# Patient Record
Sex: FEMALE
[De-identification: names, ages, dates, MRNs, and addresses within clinical notes are randomized per-mention and may not be internally consistent; named-entity substitution may affect disease eponyms.]

---

## 2020-10-15 ENCOUNTER — HOSPITAL ENCOUNTER (EMERGENCY)
Dept: HOSPITAL 88 - ER | Age: 75
Discharge: HOME | End: 2020-10-15
Payer: MEDICARE

## 2020-10-15 VITALS — WEIGHT: 180 LBS | BODY MASS INDEX: 29.99 KG/M2 | HEIGHT: 65 IN

## 2020-10-15 DIAGNOSIS — Z86.73: ICD-10-CM

## 2020-10-15 DIAGNOSIS — Z43.1: Primary | ICD-10-CM

## 2020-10-15 DIAGNOSIS — I10: ICD-10-CM

## 2020-10-15 DIAGNOSIS — E11.9: ICD-10-CM

## 2020-10-15 DIAGNOSIS — E03.9: ICD-10-CM

## 2020-10-15 DIAGNOSIS — E78.5: ICD-10-CM

## 2020-10-15 PROCEDURE — 74018 RADEX ABDOMEN 1 VIEW: CPT

## 2020-10-15 PROCEDURE — 99284 EMERGENCY DEPT VISIT MOD MDM: CPT

## 2020-10-28 ENCOUNTER — HOSPITAL ENCOUNTER (INPATIENT)
Dept: HOSPITAL 88 - ER | Age: 75
LOS: 9 days | Discharge: NURSING, REHAB OR LONG-TERM CARE | DRG: 870 | End: 2020-11-06
Attending: INTERNAL MEDICINE | Admitting: INTERNAL MEDICINE
Payer: MEDICARE

## 2020-10-28 VITALS — DIASTOLIC BLOOD PRESSURE: 63 MMHG | SYSTOLIC BLOOD PRESSURE: 96 MMHG

## 2020-10-28 VITALS — BODY MASS INDEX: 25.58 KG/M2 | WEIGHT: 153.5 LBS | HEIGHT: 65 IN

## 2020-10-28 DIAGNOSIS — R65.21: ICD-10-CM

## 2020-10-28 DIAGNOSIS — L89.154: ICD-10-CM

## 2020-10-28 DIAGNOSIS — I48.91: ICD-10-CM

## 2020-10-28 DIAGNOSIS — Z74.01: ICD-10-CM

## 2020-10-28 DIAGNOSIS — R57.0: ICD-10-CM

## 2020-10-28 DIAGNOSIS — J96.90: ICD-10-CM

## 2020-10-28 DIAGNOSIS — Z93.1: ICD-10-CM

## 2020-10-28 DIAGNOSIS — A41.9: Primary | ICD-10-CM

## 2020-10-28 DIAGNOSIS — Z66: ICD-10-CM

## 2020-10-28 DIAGNOSIS — L89.313: ICD-10-CM

## 2020-10-28 DIAGNOSIS — Z16.12: ICD-10-CM

## 2020-10-28 DIAGNOSIS — M86.9: ICD-10-CM

## 2020-10-28 DIAGNOSIS — B96.4: ICD-10-CM

## 2020-10-28 DIAGNOSIS — Z79.01: ICD-10-CM

## 2020-10-28 DIAGNOSIS — I46.9: ICD-10-CM

## 2020-10-28 DIAGNOSIS — E03.9: ICD-10-CM

## 2020-10-28 DIAGNOSIS — E87.6: ICD-10-CM

## 2020-10-28 DIAGNOSIS — Z87.01: ICD-10-CM

## 2020-10-28 DIAGNOSIS — Z16.24: ICD-10-CM

## 2020-10-28 DIAGNOSIS — R40.3: ICD-10-CM

## 2020-10-28 DIAGNOSIS — E11.9: ICD-10-CM

## 2020-10-28 DIAGNOSIS — G93.6: ICD-10-CM

## 2020-10-28 DIAGNOSIS — E78.5: ICD-10-CM

## 2020-10-28 DIAGNOSIS — R43.0: ICD-10-CM

## 2020-10-28 DIAGNOSIS — L89.323: ICD-10-CM

## 2020-10-28 DIAGNOSIS — B96.1: ICD-10-CM

## 2020-10-28 DIAGNOSIS — B96.89: ICD-10-CM

## 2020-10-28 DIAGNOSIS — I69.398: ICD-10-CM

## 2020-10-28 DIAGNOSIS — Z93.0: ICD-10-CM

## 2020-10-28 DIAGNOSIS — D63.8: ICD-10-CM

## 2020-10-28 DIAGNOSIS — J69.0: ICD-10-CM

## 2020-10-28 DIAGNOSIS — J43.9: ICD-10-CM

## 2020-10-28 DIAGNOSIS — I10: ICD-10-CM

## 2020-10-28 LAB
ALBUMIN SERPL-MCNC: 1.9 G/DL (ref 3.5–5)
ALBUMIN/GLOB SERPL: 0.3 {RATIO} (ref 0.8–2)
ALP SERPL-CCNC: 145 IU/L (ref 40–150)
ALT SERPL-CCNC: 100 IU/L (ref 0–55)
ANION GAP SERPL CALC-SCNC: 23.7 MMOL/L (ref 8–16)
BACTERIA URNS QL MICRO: (no result) /HPF
BASOPHILS # BLD AUTO: 0.1 10*3/UL (ref 0–0.1)
BASOPHILS NFR BLD AUTO: 0.6 % (ref 0–1)
BILIRUB UR QL: (no result)
BUN SERPL-MCNC: 24 MG/DL (ref 7–26)
BUN/CREAT SERPL: 36 (ref 6–25)
CALCIUM SERPL-MCNC: 10.9 MG/DL (ref 8.4–10.2)
CHLORIDE SERPL-SCNC: 94 MMOL/L (ref 98–107)
CK MB SERPL-MCNC: 2 NG/ML (ref 0–5)
CK SERPL-CCNC: 67 IU/L (ref 29–168)
CLARITY UR: (no result)
CO2 SERPL-SCNC: 18 MMOL/L (ref 22–29)
COLOR UR: (no result)
DEPRECATED NEUTROPHILS # BLD AUTO: 17 10*3/UL (ref 2.1–6.9)
DEPRECATED RBC URNS MANUAL-ACNC: >50 /HPF (ref 0–5)
EGFRCR SERPLBLD CKD-EPI 2021: > 60 ML/MIN (ref 60–?)
EOSINOPHIL # BLD AUTO: 0.3 10*3/UL (ref 0–0.4)
EOSINOPHIL NFR BLD AUTO: 1.4 % (ref 0–6)
EOSINOPHIL NFR BLD MANUAL: 1 % (ref 0–7)
EPI CELLS URNS QL MICRO: (no result) /LPF
ERYTHROCYTE [DISTWIDTH] IN CORD BLOOD: 16.9 % (ref 11.7–14.4)
GLOBULIN PLAS-MCNC: 6.2 G/DL (ref 2.3–3.5)
GLUCOSE SERPLBLD-MCNC: 231 MG/DL (ref 74–118)
HCT VFR BLD AUTO: 34.2 % (ref 34.2–44.1)
HGB BLD-MCNC: 10.4 G/DL (ref 12–16)
KETONES UR QL STRIP.AUTO: NEGATIVE
LEUKOCYTE ESTERASE UR QL STRIP.AUTO: (no result)
LYMPHOCYTES # BLD: 1.2 10*3/UL (ref 1–3.2)
LYMPHOCYTES NFR BLD AUTO: 5.7 % (ref 18–39.1)
LYMPHOCYTES NFR BLD MANUAL: 5 % (ref 19–48)
MCH RBC QN AUTO: 25.1 PG (ref 28–32)
MCHC RBC AUTO-ENTMCNC: 30.4 G/DL (ref 31–35)
MCV RBC AUTO: 82.4 FL (ref 81–99)
METAMYELOCYTES NFR BLD MANUAL: 1 % (ref 0–0)
MONOCYTES # BLD AUTO: 1.1 10*3/UL (ref 0.2–0.8)
MONOCYTES NFR BLD AUTO: 5.1 % (ref 4.4–11.3)
MONOCYTES NFR BLD MANUAL: 5 % (ref 3.4–9)
NEUTS SEG NFR BLD AUTO: 80.2 % (ref 38.7–80)
NEUTS SEG NFR BLD MANUAL: 86 % (ref 40–74)
NITRITE UR QL STRIP.AUTO: NEGATIVE
PLAT MORPH BLD: NORMAL
PLATELET # BLD AUTO: 302 X10E3/UL (ref 140–360)
PLATELET # BLD EST: ADEQUATE 10*3/UL
POTASSIUM SERPL-SCNC: 4.7 MMOL/L (ref 3.5–5.1)
PROT UR QL STRIP.AUTO: >=300
RBC # BLD AUTO: 4.15 X10E6/UL (ref 3.6–5.1)
RBC MORPH BLD: NORMAL
SODIUM SERPL-SCNC: 131 MMOL/L (ref 136–145)
SP GR UR STRIP: 1.03 (ref 1.01–1.02)
UROBILINOGEN UR STRIP-MCNC: 1 MG/DL (ref 0.2–1)
WBC #/AREA URNS HPF: (no result) /HPF (ref 0–5)

## 2020-10-28 PROCEDURE — 3E043XZ INTRODUCTION OF VASOPRESSOR INTO CENTRAL VEIN, PERCUTANEOUS APPROACH: ICD-10-PCS

## 2020-10-28 PROCEDURE — 82607 VITAMIN B-12: CPT

## 2020-10-28 PROCEDURE — 71250 CT THORAX DX C-: CPT

## 2020-10-28 PROCEDURE — 87205 SMEAR GRAM STAIN: CPT

## 2020-10-28 PROCEDURE — 82805 BLOOD GASES W/O2 SATURATION: CPT

## 2020-10-28 PROCEDURE — 02HV33Z INSERTION OF INFUSION DEVICE INTO SUPERIOR VENA CAVA, PERCUTANEOUS APPROACH: ICD-10-PCS

## 2020-10-28 PROCEDURE — 84100 ASSAY OF PHOSPHORUS: CPT

## 2020-10-28 PROCEDURE — 5A1955Z RESPIRATORY VENTILATION, GREATER THAN 96 CONSECUTIVE HOURS: ICD-10-PCS

## 2020-10-28 PROCEDURE — 36415 COLL VENOUS BLD VENIPUNCTURE: CPT

## 2020-10-28 PROCEDURE — 94003 VENT MGMT INPAT SUBQ DAY: CPT

## 2020-10-28 PROCEDURE — 87071 CULTURE AEROBIC QUANT OTHER: CPT

## 2020-10-28 PROCEDURE — 83735 ASSAY OF MAGNESIUM: CPT

## 2020-10-28 PROCEDURE — 93306 TTE W/DOPPLER COMPLETE: CPT

## 2020-10-28 PROCEDURE — 82550 ASSAY OF CK (CPK): CPT

## 2020-10-28 PROCEDURE — 71045 X-RAY EXAM CHEST 1 VIEW: CPT

## 2020-10-28 PROCEDURE — 36555 INSERT NON-TUNNEL CV CATH: CPT

## 2020-10-28 PROCEDURE — B548ZZA ULTRASONOGRAPHY OF SUPERIOR VENA CAVA, GUIDANCE: ICD-10-PCS

## 2020-10-28 PROCEDURE — 85025 COMPLETE CBC W/AUTO DIFF WBC: CPT

## 2020-10-28 PROCEDURE — 84443 ASSAY THYROID STIM HORMONE: CPT

## 2020-10-28 PROCEDURE — 74018 RADEX ABDOMEN 1 VIEW: CPT

## 2020-10-28 PROCEDURE — 36600 WITHDRAWAL OF ARTERIAL BLOOD: CPT

## 2020-10-28 PROCEDURE — 82553 CREATINE MB FRACTION: CPT

## 2020-10-28 PROCEDURE — 80202 ASSAY OF VANCOMYCIN: CPT

## 2020-10-28 PROCEDURE — 80048 BASIC METABOLIC PNL TOTAL CA: CPT

## 2020-10-28 PROCEDURE — 87186 SC STD MICRODIL/AGAR DIL: CPT

## 2020-10-28 PROCEDURE — 83540 ASSAY OF IRON: CPT

## 2020-10-28 PROCEDURE — 82948 REAGENT STRIP/BLOOD GLUCOSE: CPT

## 2020-10-28 PROCEDURE — 82746 ASSAY OF FOLIC ACID SERUM: CPT

## 2020-10-28 PROCEDURE — 5A12012 PERFORMANCE OF CARDIAC OUTPUT, SINGLE, MANUAL: ICD-10-PCS

## 2020-10-28 PROCEDURE — 94640 AIRWAY INHALATION TREATMENT: CPT

## 2020-10-28 PROCEDURE — 95812 EEG 41-60 MINUTES: CPT

## 2020-10-28 PROCEDURE — 36569 INSJ PICC 5 YR+ W/O IMAGING: CPT

## 2020-10-28 PROCEDURE — 70450 CT HEAD/BRAIN W/O DYE: CPT

## 2020-10-28 PROCEDURE — 87086 URINE CULTURE/COLONY COUNT: CPT

## 2020-10-28 PROCEDURE — 84484 ASSAY OF TROPONIN QUANT: CPT

## 2020-10-28 PROCEDURE — 94002 VENT MGMT INPAT INIT DAY: CPT

## 2020-10-28 PROCEDURE — 84466 ASSAY OF TRANSFERRIN: CPT

## 2020-10-28 PROCEDURE — 83605 ASSAY OF LACTIC ACID: CPT

## 2020-10-28 PROCEDURE — 87040 BLOOD CULTURE FOR BACTERIA: CPT

## 2020-10-28 PROCEDURE — 99251: CPT

## 2020-10-28 PROCEDURE — 80053 COMPREHEN METABOLIC PANEL: CPT

## 2020-10-28 PROCEDURE — 81001 URINALYSIS AUTO W/SCOPE: CPT

## 2020-10-28 PROCEDURE — 93005 ELECTROCARDIOGRAM TRACING: CPT

## 2020-10-28 PROCEDURE — 93971 EXTREMITY STUDY: CPT

## 2020-10-28 RX ADMIN — SODIUM CHLORIDE SCH MLS/HR: 9 INJECTION, SOLUTION INTRAVENOUS at 22:32

## 2020-10-28 RX ADMIN — SODIUM CHLORIDE SCH MG: 900 INJECTION, SOLUTION INTRAVENOUS at 22:27

## 2020-10-29 VITALS — DIASTOLIC BLOOD PRESSURE: 62 MMHG | SYSTOLIC BLOOD PRESSURE: 134 MMHG

## 2020-10-29 VITALS — SYSTOLIC BLOOD PRESSURE: 126 MMHG | DIASTOLIC BLOOD PRESSURE: 60 MMHG

## 2020-10-29 VITALS — SYSTOLIC BLOOD PRESSURE: 116 MMHG | DIASTOLIC BLOOD PRESSURE: 63 MMHG

## 2020-10-29 VITALS — DIASTOLIC BLOOD PRESSURE: 65 MMHG | SYSTOLIC BLOOD PRESSURE: 129 MMHG

## 2020-10-29 VITALS — SYSTOLIC BLOOD PRESSURE: 135 MMHG | DIASTOLIC BLOOD PRESSURE: 66 MMHG

## 2020-10-29 VITALS — SYSTOLIC BLOOD PRESSURE: 126 MMHG | DIASTOLIC BLOOD PRESSURE: 69 MMHG

## 2020-10-29 VITALS — SYSTOLIC BLOOD PRESSURE: 135 MMHG | DIASTOLIC BLOOD PRESSURE: 67 MMHG

## 2020-10-29 VITALS — SYSTOLIC BLOOD PRESSURE: 118 MMHG | DIASTOLIC BLOOD PRESSURE: 64 MMHG

## 2020-10-29 VITALS — SYSTOLIC BLOOD PRESSURE: 130 MMHG | DIASTOLIC BLOOD PRESSURE: 65 MMHG

## 2020-10-29 VITALS — DIASTOLIC BLOOD PRESSURE: 63 MMHG | SYSTOLIC BLOOD PRESSURE: 137 MMHG

## 2020-10-29 VITALS — SYSTOLIC BLOOD PRESSURE: 130 MMHG | DIASTOLIC BLOOD PRESSURE: 71 MMHG

## 2020-10-29 VITALS — DIASTOLIC BLOOD PRESSURE: 70 MMHG | SYSTOLIC BLOOD PRESSURE: 122 MMHG

## 2020-10-29 VITALS — DIASTOLIC BLOOD PRESSURE: 71 MMHG | SYSTOLIC BLOOD PRESSURE: 130 MMHG

## 2020-10-29 VITALS — DIASTOLIC BLOOD PRESSURE: 69 MMHG | SYSTOLIC BLOOD PRESSURE: 127 MMHG

## 2020-10-29 VITALS — DIASTOLIC BLOOD PRESSURE: 70 MMHG | SYSTOLIC BLOOD PRESSURE: 132 MMHG

## 2020-10-29 VITALS — SYSTOLIC BLOOD PRESSURE: 132 MMHG | DIASTOLIC BLOOD PRESSURE: 62 MMHG

## 2020-10-29 VITALS — SYSTOLIC BLOOD PRESSURE: 126 MMHG | DIASTOLIC BLOOD PRESSURE: 64 MMHG

## 2020-10-29 VITALS — DIASTOLIC BLOOD PRESSURE: 68 MMHG | SYSTOLIC BLOOD PRESSURE: 126 MMHG

## 2020-10-29 VITALS — SYSTOLIC BLOOD PRESSURE: 120 MMHG | DIASTOLIC BLOOD PRESSURE: 62 MMHG

## 2020-10-29 VITALS — DIASTOLIC BLOOD PRESSURE: 63 MMHG | SYSTOLIC BLOOD PRESSURE: 109 MMHG

## 2020-10-29 VITALS — DIASTOLIC BLOOD PRESSURE: 65 MMHG | SYSTOLIC BLOOD PRESSURE: 134 MMHG

## 2020-10-29 VITALS — DIASTOLIC BLOOD PRESSURE: 64 MMHG | SYSTOLIC BLOOD PRESSURE: 113 MMHG

## 2020-10-29 VITALS — DIASTOLIC BLOOD PRESSURE: 64 MMHG | SYSTOLIC BLOOD PRESSURE: 134 MMHG

## 2020-10-29 VITALS — SYSTOLIC BLOOD PRESSURE: 122 MMHG | DIASTOLIC BLOOD PRESSURE: 69 MMHG

## 2020-10-29 VITALS — DIASTOLIC BLOOD PRESSURE: 62 MMHG | SYSTOLIC BLOOD PRESSURE: 124 MMHG

## 2020-10-29 LAB
ALBUMIN SERPL-MCNC: 1.8 G/DL (ref 3.5–5)
ALBUMIN/GLOB SERPL: 0.3 {RATIO} (ref 0.8–2)
ALP SERPL-CCNC: 118 IU/L (ref 40–150)
ALT SERPL-CCNC: 91 IU/L (ref 0–55)
ANION GAP SERPL CALC-SCNC: 12.7 MMOL/L (ref 8–16)
ANISOCYTOSIS BLD QL SMEAR: SLIGHT
BASOPHILS # BLD AUTO: 0.1 10*3/UL (ref 0–0.1)
BASOPHILS NFR BLD AUTO: 0.3 % (ref 0–1)
BUN SERPL-MCNC: 27 MG/DL (ref 7–26)
BUN/CREAT SERPL: 47 (ref 6–25)
BURR CELLS BLD QL SMEAR: SLIGHT
CALCIUM SERPL-MCNC: 9.7 MG/DL (ref 8.4–10.2)
CHLORIDE SERPL-SCNC: 104 MMOL/L (ref 98–107)
CK MB SERPL-MCNC: 3.1 NG/ML (ref 0–5)
CK MB SERPL-MCNC: 3.7 NG/ML (ref 0–5)
CK SERPL-CCNC: 38 IU/L (ref 29–168)
CK SERPL-CCNC: 51 IU/L (ref 29–168)
CO2 BLDCOA CALC-SCNC: 24 MMOL/L
CO2 SERPL-SCNC: 22 MMOL/L (ref 22–29)
DACRYOCYTES BLD QL SMEAR: (no result)
DEPRECATED NEUTROPHILS # BLD AUTO: 24.5 10*3/UL (ref 2.1–6.9)
EGFRCR SERPLBLD CKD-EPI 2021: > 60 ML/MIN (ref 60–?)
EOSINOPHIL # BLD AUTO: 0 10*3/UL (ref 0–0.4)
EOSINOPHIL NFR BLD AUTO: 0.1 % (ref 0–6)
ERYTHROCYTE [DISTWIDTH] IN CORD BLOOD: 16.3 % (ref 11.7–14.4)
GLOBULIN PLAS-MCNC: 5.4 G/DL (ref 2.3–3.5)
GLUCOSE SERPLBLD-MCNC: 154 MG/DL (ref 74–118)
HCO3 BLDA-SCNC: 23 MMOL/L (ref 22–26)
HCT VFR BLD AUTO: 27.2 % (ref 34.2–44.1)
HGB BLD-MCNC: 8.4 G/DL (ref 12–16)
LYMPHOCYTES # BLD: 0.8 10*3/UL (ref 1–3.2)
LYMPHOCYTES NFR BLD AUTO: 2.8 % (ref 18–39.1)
LYMPHOCYTES NFR BLD MANUAL: 2 % (ref 19–48)
MCH RBC QN AUTO: 25 PG (ref 28–32)
MCHC RBC AUTO-ENTMCNC: 30.9 G/DL (ref 31–35)
MCV RBC AUTO: 81 FL (ref 81–99)
MONOCYTES # BLD AUTO: 1.7 10*3/UL (ref 0.2–0.8)
MONOCYTES NFR BLD AUTO: 6.1 % (ref 4.4–11.3)
MONOCYTES NFR BLD MANUAL: 4 % (ref 3.4–9)
NEUTS SEG NFR BLD AUTO: 89 % (ref 38.7–80)
NEUTS SEG NFR BLD MANUAL: 94 % (ref 40–74)
PCO2 BLDA: 222 MMHG (ref 80–105)
PCO2 BLDA: 32 MMHG (ref 35–45)
PH BLDA: 7.46 [PH] (ref 7.35–7.45)
PLAT MORPH BLD: NORMAL
PLATELET # BLD AUTO: 321 X10E3/UL (ref 140–360)
PLATELET # BLD EST: ADEQUATE 10*3/UL
POLYCHROMASIA BLD QL SMEAR: (no result)
POTASSIUM SERPL-SCNC: 3.7 MMOL/L (ref 3.5–5.1)
RBC # BLD AUTO: 3.36 X10E6/UL (ref 3.6–5.1)
RBC MORPH BLD: NORMAL
SAO2 % BLDA: 100 % (ref 95–98)
SODIUM SERPL-SCNC: 135 MMOL/L (ref 136–145)

## 2020-10-29 PROCEDURE — B548ZZA ULTRASONOGRAPHY OF SUPERIOR VENA CAVA, GUIDANCE: ICD-10-PCS | Performed by: INTERNAL MEDICINE

## 2020-10-29 PROCEDURE — 02HV33Z INSERTION OF INFUSION DEVICE INTO SUPERIOR VENA CAVA, PERCUTANEOUS APPROACH: ICD-10-PCS | Performed by: INTERNAL MEDICINE

## 2020-10-29 RX ADMIN — SODIUM CHLORIDE SCH MLS/HR: 9 INJECTION, SOLUTION INTRAVENOUS at 14:16

## 2020-10-29 RX ADMIN — NYSTATIN SCH ML: 500000 SUSPENSION ORAL at 17:49

## 2020-10-29 RX ADMIN — NYSTATIN SCH ML: 500000 SUSPENSION ORAL at 23:13

## 2020-10-29 RX ADMIN — VANCOMYCIN HYDROCHLORIDE SCH MLS/HR: 1 INJECTION, SOLUTION INTRAVENOUS at 22:41

## 2020-10-29 RX ADMIN — INSULIN LISPRO SCH UNIT: 100 INJECTION, SOLUTION INTRAVENOUS; SUBCUTANEOUS at 12:00

## 2020-10-29 RX ADMIN — INSULIN GLARGINE SCH UNITS: 100 INJECTION, SOLUTION SUBCUTANEOUS at 19:41

## 2020-10-29 RX ADMIN — INSULIN LISPRO SCH UNIT: 100 INJECTION, SOLUTION INTRAVENOUS; SUBCUTANEOUS at 23:30

## 2020-10-29 RX ADMIN — TAZOBACTAM SODIUM AND PIPERACILLIN SODIUM SCH MLS/HR: 375; 3 INJECTION, SOLUTION INTRAVENOUS at 17:49

## 2020-10-29 RX ADMIN — VANCOMYCIN HYDROCHLORIDE SCH MLS/HR: 1 INJECTION, SOLUTION INTRAVENOUS at 11:16

## 2020-10-29 RX ADMIN — SODIUM CHLORIDE SCH MLS/HR: 9 INJECTION, SOLUTION INTRAVENOUS at 05:39

## 2020-10-29 RX ADMIN — NYSTATIN SCH ML: 500000 SUSPENSION ORAL at 11:16

## 2020-10-29 RX ADMIN — SODIUM CHLORIDE SCH MLS/HR: 9 INJECTION, SOLUTION INTRAVENOUS at 19:41

## 2020-10-29 RX ADMIN — SODIUM CHLORIDE SCH MG: 900 INJECTION, SOLUTION INTRAVENOUS at 08:19

## 2020-10-29 RX ADMIN — TAZOBACTAM SODIUM AND PIPERACILLIN SODIUM SCH MLS/HR: 375; 3 INJECTION, SOLUTION INTRAVENOUS at 11:16

## 2020-10-29 RX ADMIN — SODIUM CHLORIDE SCH MLS/HR: 9 INJECTION, SOLUTION INTRAVENOUS at 11:15

## 2020-10-29 RX ADMIN — INSULIN LISPRO SCH UNIT: 100 INJECTION, SOLUTION INTRAVENOUS; SUBCUTANEOUS at 17:49

## 2020-10-29 RX ADMIN — SODIUM CHLORIDE SCH MG: 900 INJECTION INTRAVENOUS at 11:34

## 2020-10-29 RX ADMIN — TAZOBACTAM SODIUM AND PIPERACILLIN SODIUM SCH MLS/HR: 375; 3 INJECTION, SOLUTION INTRAVENOUS at 23:13

## 2020-10-29 RX ADMIN — Medication SCH ML: at 00:50

## 2020-10-29 RX ADMIN — SODIUM CHLORIDE SCH MLS/HR: 9 INJECTION, SOLUTION INTRAVENOUS at 22:41

## 2020-10-29 RX ADMIN — Medication SCH ML: at 15:00

## 2020-10-30 VITALS — DIASTOLIC BLOOD PRESSURE: 60 MMHG | SYSTOLIC BLOOD PRESSURE: 133 MMHG

## 2020-10-30 VITALS — SYSTOLIC BLOOD PRESSURE: 108 MMHG | DIASTOLIC BLOOD PRESSURE: 55 MMHG

## 2020-10-30 VITALS — SYSTOLIC BLOOD PRESSURE: 149 MMHG | DIASTOLIC BLOOD PRESSURE: 65 MMHG

## 2020-10-30 VITALS — SYSTOLIC BLOOD PRESSURE: 145 MMHG | DIASTOLIC BLOOD PRESSURE: 69 MMHG

## 2020-10-30 VITALS — DIASTOLIC BLOOD PRESSURE: 68 MMHG | SYSTOLIC BLOOD PRESSURE: 132 MMHG

## 2020-10-30 VITALS — SYSTOLIC BLOOD PRESSURE: 134 MMHG | DIASTOLIC BLOOD PRESSURE: 68 MMHG

## 2020-10-30 VITALS — SYSTOLIC BLOOD PRESSURE: 129 MMHG | DIASTOLIC BLOOD PRESSURE: 59 MMHG

## 2020-10-30 VITALS — SYSTOLIC BLOOD PRESSURE: 140 MMHG | DIASTOLIC BLOOD PRESSURE: 72 MMHG

## 2020-10-30 VITALS — SYSTOLIC BLOOD PRESSURE: 134 MMHG | DIASTOLIC BLOOD PRESSURE: 63 MMHG

## 2020-10-30 VITALS — DIASTOLIC BLOOD PRESSURE: 55 MMHG | SYSTOLIC BLOOD PRESSURE: 125 MMHG

## 2020-10-30 VITALS — DIASTOLIC BLOOD PRESSURE: 59 MMHG | SYSTOLIC BLOOD PRESSURE: 125 MMHG

## 2020-10-30 VITALS — SYSTOLIC BLOOD PRESSURE: 123 MMHG | DIASTOLIC BLOOD PRESSURE: 62 MMHG

## 2020-10-30 VITALS — SYSTOLIC BLOOD PRESSURE: 135 MMHG | DIASTOLIC BLOOD PRESSURE: 70 MMHG

## 2020-10-30 VITALS — SYSTOLIC BLOOD PRESSURE: 127 MMHG | DIASTOLIC BLOOD PRESSURE: 60 MMHG

## 2020-10-30 VITALS — SYSTOLIC BLOOD PRESSURE: 143 MMHG | DIASTOLIC BLOOD PRESSURE: 68 MMHG

## 2020-10-30 VITALS — DIASTOLIC BLOOD PRESSURE: 65 MMHG | SYSTOLIC BLOOD PRESSURE: 134 MMHG

## 2020-10-30 VITALS — DIASTOLIC BLOOD PRESSURE: 55 MMHG | SYSTOLIC BLOOD PRESSURE: 115 MMHG

## 2020-10-30 VITALS — DIASTOLIC BLOOD PRESSURE: 65 MMHG | SYSTOLIC BLOOD PRESSURE: 136 MMHG

## 2020-10-30 VITALS — DIASTOLIC BLOOD PRESSURE: 59 MMHG | SYSTOLIC BLOOD PRESSURE: 131 MMHG

## 2020-10-30 VITALS — DIASTOLIC BLOOD PRESSURE: 50 MMHG | SYSTOLIC BLOOD PRESSURE: 113 MMHG

## 2020-10-30 VITALS — DIASTOLIC BLOOD PRESSURE: 66 MMHG | SYSTOLIC BLOOD PRESSURE: 129 MMHG

## 2020-10-30 VITALS — SYSTOLIC BLOOD PRESSURE: 136 MMHG | DIASTOLIC BLOOD PRESSURE: 62 MMHG

## 2020-10-30 LAB
ALBUMIN SERPL-MCNC: 1.6 G/DL (ref 3.5–5)
ALBUMIN/GLOB SERPL: 0.3 {RATIO} (ref 0.8–2)
ALP SERPL-CCNC: 106 IU/L (ref 40–150)
ALT SERPL-CCNC: 53 IU/L (ref 0–55)
ANION GAP SERPL CALC-SCNC: 12.2 MMOL/L (ref 8–16)
BASOPHILS # BLD AUTO: 0.1 10*3/UL (ref 0–0.1)
BASOPHILS NFR BLD AUTO: 0.4 % (ref 0–1)
BUN SERPL-MCNC: 17 MG/DL (ref 7–26)
BUN/CREAT SERPL: 30 (ref 6–25)
CALCIUM SERPL-MCNC: 9.6 MG/DL (ref 8.4–10.2)
CHLORIDE SERPL-SCNC: 111 MMOL/L (ref 98–107)
CO2 SERPL-SCNC: 21 MMOL/L (ref 22–29)
DEPRECATED NEUTROPHILS # BLD AUTO: 18.7 10*3/UL (ref 2.1–6.9)
DEPRECATED PHOSPHATE SERPL-MCNC: 2.5 MG/DL (ref 2.3–4.7)
EGFRCR SERPLBLD CKD-EPI 2021: > 60 ML/MIN (ref 60–?)
EOSINOPHIL # BLD AUTO: 0.1 10*3/UL (ref 0–0.4)
EOSINOPHIL NFR BLD AUTO: 0.5 % (ref 0–6)
ERYTHROCYTE [DISTWIDTH] IN CORD BLOOD: 17 % (ref 11.7–14.4)
GLOBULIN PLAS-MCNC: 4.9 G/DL (ref 2.3–3.5)
GLUCOSE SERPLBLD-MCNC: 119 MG/DL (ref 74–118)
HCT VFR BLD AUTO: 23.9 % (ref 34.2–44.1)
HGB BLD-MCNC: 7.3 G/DL (ref 12–16)
IRON SATN MFR SERPL: 8 % (ref 15–50)
IRON SERPL-MCNC: 14 UG/DL (ref 50–170)
LYMPHOCYTES # BLD: 1.1 10*3/UL (ref 1–3.2)
LYMPHOCYTES NFR BLD AUTO: 4.9 % (ref 18–39.1)
MAGNESIUM SERPL-MCNC: 1.6 MG/DL (ref 1.3–2.1)
MCH RBC QN AUTO: 24.8 PG (ref 28–32)
MCHC RBC AUTO-ENTMCNC: 30.5 G/DL (ref 31–35)
MCV RBC AUTO: 81.3 FL (ref 81–99)
MONOCYTES # BLD AUTO: 1.5 10*3/UL (ref 0.2–0.8)
MONOCYTES NFR BLD AUTO: 7 % (ref 4.4–11.3)
NEUTS SEG NFR BLD AUTO: 86.2 % (ref 38.7–80)
PLATELET # BLD AUTO: 279 X10E3/UL (ref 140–360)
POTASSIUM SERPL-SCNC: 3.2 MMOL/L (ref 3.5–5.1)
RBC # BLD AUTO: 2.94 X10E6/UL (ref 3.6–5.1)
SODIUM SERPL-SCNC: 141 MMOL/L (ref 136–145)
TIBC SERPL-MCNC: 178 UG/DL (ref 261–478)
TRANSFERRIN SERPL-MCNC: 127 MG/DL (ref 180–382)
TSH SERPL DL<=0.005 MIU/L-ACNC: 0 UIU/ML (ref 0.35–4.94)

## 2020-10-30 RX ADMIN — INSULIN GLARGINE SCH UNITS: 100 INJECTION, SOLUTION SUBCUTANEOUS at 20:11

## 2020-10-30 RX ADMIN — Medication SCH ML: at 22:40

## 2020-10-30 RX ADMIN — NYSTATIN SCH ML: 500000 SUSPENSION ORAL at 05:49

## 2020-10-30 RX ADMIN — VANCOMYCIN HYDROCHLORIDE SCH MLS/HR: 1 INJECTION, SOLUTION INTRAVENOUS at 22:47

## 2020-10-30 RX ADMIN — SODIUM CHLORIDE SCH MLS/HR: 9 INJECTION, SOLUTION INTRAVENOUS at 20:11

## 2020-10-30 RX ADMIN — SODIUM CHLORIDE SCH MLS/HR: 9 INJECTION, SOLUTION INTRAVENOUS at 13:19

## 2020-10-30 RX ADMIN — INSULIN LISPRO SCH UNIT: 100 INJECTION, SOLUTION INTRAVENOUS; SUBCUTANEOUS at 12:00

## 2020-10-30 RX ADMIN — SODIUM CHLORIDE SCH MG: 900 INJECTION INTRAVENOUS at 09:10

## 2020-10-30 RX ADMIN — TAZOBACTAM SODIUM AND PIPERACILLIN SODIUM SCH MLS/HR: 375; 3 INJECTION, SOLUTION INTRAVENOUS at 17:21

## 2020-10-30 RX ADMIN — INSULIN LISPRO SCH UNIT: 100 INJECTION, SOLUTION INTRAVENOUS; SUBCUTANEOUS at 23:10

## 2020-10-30 RX ADMIN — POTASSIUM CHLORIDE SCH MLS/HR: 14.9 INJECTION, SOLUTION INTRAVENOUS at 13:19

## 2020-10-30 RX ADMIN — NYSTATIN SCH ML: 500000 SUSPENSION ORAL at 17:21

## 2020-10-30 RX ADMIN — Medication SCH ML: at 07:25

## 2020-10-30 RX ADMIN — POTASSIUM CHLORIDE SCH MLS/HR: 14.9 INJECTION, SOLUTION INTRAVENOUS at 12:12

## 2020-10-30 RX ADMIN — INSULIN LISPRO SCH UNIT: 100 INJECTION, SOLUTION INTRAVENOUS; SUBCUTANEOUS at 05:49

## 2020-10-30 RX ADMIN — INSULIN LISPRO SCH UNIT: 100 INJECTION, SOLUTION INTRAVENOUS; SUBCUTANEOUS at 17:28

## 2020-10-30 RX ADMIN — NYSTATIN SCH ML: 500000 SUSPENSION ORAL at 23:10

## 2020-10-30 RX ADMIN — POTASSIUM CHLORIDE SCH MLS/HR: 14.9 INJECTION, SOLUTION INTRAVENOUS at 11:16

## 2020-10-30 RX ADMIN — SODIUM CHLORIDE SCH MLS/HR: 9 INJECTION, SOLUTION INTRAVENOUS at 09:11

## 2020-10-30 RX ADMIN — SODIUM CHLORIDE SCH MLS/HR: 9 INJECTION, SOLUTION INTRAVENOUS at 05:49

## 2020-10-30 RX ADMIN — TAZOBACTAM SODIUM AND PIPERACILLIN SODIUM SCH MLS/HR: 375; 3 INJECTION, SOLUTION INTRAVENOUS at 23:10

## 2020-10-30 RX ADMIN — TAZOBACTAM SODIUM AND PIPERACILLIN SODIUM SCH MLS/HR: 375; 3 INJECTION, SOLUTION INTRAVENOUS at 12:16

## 2020-10-30 RX ADMIN — TAZOBACTAM SODIUM AND PIPERACILLIN SODIUM SCH MLS/HR: 375; 3 INJECTION, SOLUTION INTRAVENOUS at 05:49

## 2020-10-30 RX ADMIN — Medication SCH ML: at 15:29

## 2020-10-30 RX ADMIN — NYSTATIN SCH ML: 500000 SUSPENSION ORAL at 12:16

## 2020-10-30 RX ADMIN — ASPIRIN 81 MG CHEWABLE TABLET SCH MG: 81 TABLET CHEWABLE at 09:11

## 2020-10-30 RX ADMIN — VANCOMYCIN HYDROCHLORIDE SCH MLS/HR: 1 INJECTION, SOLUTION INTRAVENOUS at 11:16

## 2020-10-30 RX ADMIN — SODIUM CHLORIDE SCH MLS/HR: 9 INJECTION, SOLUTION INTRAVENOUS at 22:02

## 2020-10-31 VITALS — SYSTOLIC BLOOD PRESSURE: 162 MMHG | DIASTOLIC BLOOD PRESSURE: 79 MMHG

## 2020-10-31 VITALS — DIASTOLIC BLOOD PRESSURE: 64 MMHG | SYSTOLIC BLOOD PRESSURE: 122 MMHG

## 2020-10-31 VITALS — SYSTOLIC BLOOD PRESSURE: 137 MMHG | DIASTOLIC BLOOD PRESSURE: 62 MMHG

## 2020-10-31 VITALS — SYSTOLIC BLOOD PRESSURE: 126 MMHG | DIASTOLIC BLOOD PRESSURE: 71 MMHG

## 2020-10-31 VITALS — DIASTOLIC BLOOD PRESSURE: 61 MMHG | SYSTOLIC BLOOD PRESSURE: 124 MMHG

## 2020-10-31 VITALS — SYSTOLIC BLOOD PRESSURE: 135 MMHG | DIASTOLIC BLOOD PRESSURE: 55 MMHG

## 2020-10-31 VITALS — SYSTOLIC BLOOD PRESSURE: 123 MMHG | DIASTOLIC BLOOD PRESSURE: 59 MMHG

## 2020-10-31 VITALS — SYSTOLIC BLOOD PRESSURE: 119 MMHG | DIASTOLIC BLOOD PRESSURE: 50 MMHG

## 2020-10-31 VITALS — DIASTOLIC BLOOD PRESSURE: 65 MMHG | SYSTOLIC BLOOD PRESSURE: 133 MMHG

## 2020-10-31 VITALS — DIASTOLIC BLOOD PRESSURE: 62 MMHG | SYSTOLIC BLOOD PRESSURE: 136 MMHG

## 2020-10-31 VITALS — DIASTOLIC BLOOD PRESSURE: 64 MMHG | SYSTOLIC BLOOD PRESSURE: 142 MMHG

## 2020-10-31 VITALS — DIASTOLIC BLOOD PRESSURE: 62 MMHG | SYSTOLIC BLOOD PRESSURE: 135 MMHG

## 2020-10-31 VITALS — SYSTOLIC BLOOD PRESSURE: 129 MMHG | DIASTOLIC BLOOD PRESSURE: 62 MMHG

## 2020-10-31 VITALS — SYSTOLIC BLOOD PRESSURE: 118 MMHG | DIASTOLIC BLOOD PRESSURE: 50 MMHG

## 2020-10-31 VITALS — SYSTOLIC BLOOD PRESSURE: 136 MMHG | DIASTOLIC BLOOD PRESSURE: 84 MMHG

## 2020-10-31 VITALS — DIASTOLIC BLOOD PRESSURE: 54 MMHG | SYSTOLIC BLOOD PRESSURE: 121 MMHG

## 2020-10-31 VITALS — SYSTOLIC BLOOD PRESSURE: 136 MMHG | DIASTOLIC BLOOD PRESSURE: 61 MMHG

## 2020-10-31 VITALS — DIASTOLIC BLOOD PRESSURE: 73 MMHG | SYSTOLIC BLOOD PRESSURE: 137 MMHG

## 2020-10-31 VITALS — DIASTOLIC BLOOD PRESSURE: 60 MMHG | SYSTOLIC BLOOD PRESSURE: 140 MMHG

## 2020-10-31 VITALS — SYSTOLIC BLOOD PRESSURE: 130 MMHG | DIASTOLIC BLOOD PRESSURE: 62 MMHG

## 2020-10-31 VITALS — DIASTOLIC BLOOD PRESSURE: 60 MMHG | SYSTOLIC BLOOD PRESSURE: 138 MMHG

## 2020-10-31 VITALS — SYSTOLIC BLOOD PRESSURE: 150 MMHG | DIASTOLIC BLOOD PRESSURE: 72 MMHG

## 2020-10-31 VITALS — SYSTOLIC BLOOD PRESSURE: 145 MMHG | DIASTOLIC BLOOD PRESSURE: 61 MMHG

## 2020-10-31 LAB
ANION GAP SERPL CALC-SCNC: 12.2 MMOL/L (ref 8–16)
BASOPHILS # BLD AUTO: 0.1 10*3/UL (ref 0–0.1)
BASOPHILS NFR BLD AUTO: 0.4 % (ref 0–1)
BUN SERPL-MCNC: 12 MG/DL (ref 7–26)
BUN/CREAT SERPL: 21 (ref 6–25)
CALCIUM SERPL-MCNC: 9.5 MG/DL (ref 8.4–10.2)
CHLORIDE SERPL-SCNC: 112 MMOL/L (ref 98–107)
CO2 SERPL-SCNC: 19 MMOL/L (ref 22–29)
DEPRECATED NEUTROPHILS # BLD AUTO: 14.5 10*3/UL (ref 2.1–6.9)
DEPRECATED PHOSPHATE SERPL-MCNC: 2.6 MG/DL (ref 2.3–4.7)
EGFRCR SERPLBLD CKD-EPI 2021: > 60 ML/MIN (ref 60–?)
EOSINOPHIL # BLD AUTO: 0.2 10*3/UL (ref 0–0.4)
EOSINOPHIL NFR BLD AUTO: 1.1 % (ref 0–6)
ERYTHROCYTE [DISTWIDTH] IN CORD BLOOD: 17.2 % (ref 11.7–14.4)
GLUCOSE SERPLBLD-MCNC: 101 MG/DL (ref 74–118)
HCT VFR BLD AUTO: 23.3 % (ref 34.2–44.1)
HGB BLD-MCNC: 7.1 G/DL (ref 12–16)
LYMPHOCYTES # BLD: 1.2 10*3/UL (ref 1–3.2)
LYMPHOCYTES NFR BLD AUTO: 6.8 % (ref 18–39.1)
MAGNESIUM SERPL-MCNC: 1.6 MG/DL (ref 1.3–2.1)
MCH RBC QN AUTO: 24.8 PG (ref 28–32)
MCHC RBC AUTO-ENTMCNC: 30.5 G/DL (ref 31–35)
MCV RBC AUTO: 81.5 FL (ref 81–99)
MONOCYTES # BLD AUTO: 1.5 10*3/UL (ref 0.2–0.8)
MONOCYTES NFR BLD AUTO: 8.4 % (ref 4.4–11.3)
NEUTS SEG NFR BLD AUTO: 82.4 % (ref 38.7–80)
PLATELET # BLD AUTO: 283 X10E3/UL (ref 140–360)
POTASSIUM SERPL-SCNC: 4.2 MMOL/L (ref 3.5–5.1)
RBC # BLD AUTO: 2.86 X10E6/UL (ref 3.6–5.1)
SODIUM SERPL-SCNC: 139 MMOL/L (ref 136–145)

## 2020-10-31 RX ADMIN — INSULIN LISPRO SCH UNIT: 100 INJECTION, SOLUTION INTRAVENOUS; SUBCUTANEOUS at 18:00

## 2020-10-31 RX ADMIN — Medication SCH ML: at 15:35

## 2020-10-31 RX ADMIN — TAZOBACTAM SODIUM AND PIPERACILLIN SODIUM SCH MLS/HR: 375; 3 INJECTION, SOLUTION INTRAVENOUS at 23:37

## 2020-10-31 RX ADMIN — Medication SCH ML: at 07:22

## 2020-10-31 RX ADMIN — NYSTATIN SCH ML: 500000 SUSPENSION ORAL at 05:31

## 2020-10-31 RX ADMIN — SODIUM CHLORIDE SCH MG: 900 INJECTION INTRAVENOUS at 09:12

## 2020-10-31 RX ADMIN — INSULIN LISPRO SCH UNIT: 100 INJECTION, SOLUTION INTRAVENOUS; SUBCUTANEOUS at 05:31

## 2020-10-31 RX ADMIN — INSULIN LISPRO SCH UNIT: 100 INJECTION, SOLUTION INTRAVENOUS; SUBCUTANEOUS at 11:53

## 2020-10-31 RX ADMIN — SODIUM CHLORIDE SCH MLS/HR: 9 INJECTION, SOLUTION INTRAVENOUS at 09:12

## 2020-10-31 RX ADMIN — VANCOMYCIN HYDROCHLORIDE SCH MLS/HR: 1 INJECTION, SOLUTION INTRAVENOUS at 11:00

## 2020-10-31 RX ADMIN — SODIUM CHLORIDE SCH MLS/HR: 9 INJECTION, SOLUTION INTRAVENOUS at 05:31

## 2020-10-31 RX ADMIN — SODIUM CHLORIDE SCH MLS/HR: 9 INJECTION, SOLUTION INTRAVENOUS at 12:26

## 2020-10-31 RX ADMIN — Medication SCH ML: at 23:15

## 2020-10-31 RX ADMIN — NYSTATIN SCH ML: 500000 SUSPENSION ORAL at 23:13

## 2020-10-31 RX ADMIN — TAZOBACTAM SODIUM AND PIPERACILLIN SODIUM SCH MLS/HR: 375; 3 INJECTION, SOLUTION INTRAVENOUS at 18:19

## 2020-10-31 RX ADMIN — TAZOBACTAM SODIUM AND PIPERACILLIN SODIUM SCH MLS/HR: 375; 3 INJECTION, SOLUTION INTRAVENOUS at 11:16

## 2020-10-31 RX ADMIN — ASPIRIN 81 MG CHEWABLE TABLET SCH MG: 81 TABLET CHEWABLE at 09:12

## 2020-10-31 RX ADMIN — SODIUM CHLORIDE SCH MLS/HR: 9 INJECTION, SOLUTION INTRAVENOUS at 20:06

## 2020-10-31 RX ADMIN — NYSTATIN SCH ML: 500000 SUSPENSION ORAL at 18:19

## 2020-10-31 RX ADMIN — TAZOBACTAM SODIUM AND PIPERACILLIN SODIUM SCH MLS/HR: 375; 3 INJECTION, SOLUTION INTRAVENOUS at 05:31

## 2020-10-31 RX ADMIN — NYSTATIN SCH ML: 500000 SUSPENSION ORAL at 11:16

## 2020-10-31 RX ADMIN — INSULIN GLARGINE SCH UNITS: 100 INJECTION, SOLUTION SUBCUTANEOUS at 21:31

## 2020-11-01 VITALS — SYSTOLIC BLOOD PRESSURE: 154 MMHG | DIASTOLIC BLOOD PRESSURE: 66 MMHG

## 2020-11-01 VITALS — SYSTOLIC BLOOD PRESSURE: 149 MMHG | DIASTOLIC BLOOD PRESSURE: 71 MMHG

## 2020-11-01 VITALS — SYSTOLIC BLOOD PRESSURE: 148 MMHG | DIASTOLIC BLOOD PRESSURE: 63 MMHG

## 2020-11-01 VITALS — DIASTOLIC BLOOD PRESSURE: 71 MMHG | SYSTOLIC BLOOD PRESSURE: 155 MMHG

## 2020-11-01 VITALS — SYSTOLIC BLOOD PRESSURE: 162 MMHG | DIASTOLIC BLOOD PRESSURE: 79 MMHG

## 2020-11-01 VITALS — DIASTOLIC BLOOD PRESSURE: 61 MMHG | SYSTOLIC BLOOD PRESSURE: 149 MMHG

## 2020-11-01 VITALS — DIASTOLIC BLOOD PRESSURE: 66 MMHG | SYSTOLIC BLOOD PRESSURE: 153 MMHG

## 2020-11-01 VITALS — DIASTOLIC BLOOD PRESSURE: 66 MMHG | SYSTOLIC BLOOD PRESSURE: 146 MMHG

## 2020-11-01 VITALS — SYSTOLIC BLOOD PRESSURE: 157 MMHG | DIASTOLIC BLOOD PRESSURE: 64 MMHG

## 2020-11-01 VITALS — DIASTOLIC BLOOD PRESSURE: 67 MMHG | SYSTOLIC BLOOD PRESSURE: 138 MMHG

## 2020-11-01 VITALS — SYSTOLIC BLOOD PRESSURE: 144 MMHG | DIASTOLIC BLOOD PRESSURE: 74 MMHG

## 2020-11-01 VITALS — DIASTOLIC BLOOD PRESSURE: 74 MMHG | SYSTOLIC BLOOD PRESSURE: 144 MMHG

## 2020-11-01 VITALS — SYSTOLIC BLOOD PRESSURE: 151 MMHG | DIASTOLIC BLOOD PRESSURE: 64 MMHG

## 2020-11-01 VITALS — SYSTOLIC BLOOD PRESSURE: 143 MMHG | DIASTOLIC BLOOD PRESSURE: 64 MMHG

## 2020-11-01 VITALS — SYSTOLIC BLOOD PRESSURE: 144 MMHG | DIASTOLIC BLOOD PRESSURE: 50 MMHG

## 2020-11-01 LAB
ALBUMIN SERPL-MCNC: 1.6 G/DL (ref 3.5–5)
ALBUMIN/GLOB SERPL: 0.3 {RATIO} (ref 0.8–2)
ALP SERPL-CCNC: 103 IU/L (ref 40–150)
ALT SERPL-CCNC: 26 IU/L (ref 0–55)
ANION GAP SERPL CALC-SCNC: 9.3 MMOL/L (ref 8–16)
BASOPHILS # BLD AUTO: 0.1 10*3/UL (ref 0–0.1)
BASOPHILS NFR BLD AUTO: 0.3 % (ref 0–1)
BUN SERPL-MCNC: 9 MG/DL (ref 7–26)
BUN/CREAT SERPL: 18 (ref 6–25)
CALCIUM SERPL-MCNC: 8.7 MG/DL (ref 8.4–10.2)
CHLORIDE SERPL-SCNC: 113 MMOL/L (ref 98–107)
CO2 SERPL-SCNC: 19 MMOL/L (ref 22–29)
DEPRECATED NEUTROPHILS # BLD AUTO: 22 10*3/UL (ref 2.1–6.9)
EGFRCR SERPLBLD CKD-EPI 2021: > 60 ML/MIN (ref 60–?)
EOSINOPHIL # BLD AUTO: 0.1 10*3/UL (ref 0–0.4)
EOSINOPHIL NFR BLD AUTO: 0.2 % (ref 0–6)
ERYTHROCYTE [DISTWIDTH] IN CORD BLOOD: 17.2 % (ref 11.7–14.4)
GLOBULIN PLAS-MCNC: 5.1 G/DL (ref 2.3–3.5)
GLUCOSE SERPLBLD-MCNC: 93 MG/DL (ref 74–118)
HCT VFR BLD AUTO: 22.5 % (ref 34.2–44.1)
HGB BLD-MCNC: 7 G/DL (ref 12–16)
LYMPHOCYTES # BLD: 1.3 10*3/UL (ref 1–3.2)
LYMPHOCYTES NFR BLD AUTO: 4.9 % (ref 18–39.1)
MCH RBC QN AUTO: 25.7 PG (ref 28–32)
MCHC RBC AUTO-ENTMCNC: 31.1 G/DL (ref 31–35)
MCV RBC AUTO: 82.7 FL (ref 81–99)
MONOCYTES # BLD AUTO: 1.7 10*3/UL (ref 0.2–0.8)
MONOCYTES NFR BLD AUTO: 6.7 % (ref 4.4–11.3)
NEUTS SEG NFR BLD AUTO: 86.4 % (ref 38.7–80)
PLATELET # BLD AUTO: 255 X10E3/UL (ref 140–360)
POTASSIUM SERPL-SCNC: 3.3 MMOL/L (ref 3.5–5.1)
RBC # BLD AUTO: 2.72 X10E6/UL (ref 3.6–5.1)
SODIUM SERPL-SCNC: 138 MMOL/L (ref 136–145)

## 2020-11-01 RX ADMIN — NYSTATIN SCH ML: 500000 SUSPENSION ORAL at 06:43

## 2020-11-01 RX ADMIN — TAZOBACTAM SODIUM AND PIPERACILLIN SODIUM SCH MLS/HR: 375; 3 INJECTION, SOLUTION INTRAVENOUS at 12:44

## 2020-11-01 RX ADMIN — TAZOBACTAM SODIUM AND PIPERACILLIN SODIUM SCH MLS/HR: 375; 3 INJECTION, SOLUTION INTRAVENOUS at 23:43

## 2020-11-01 RX ADMIN — INSULIN LISPRO SCH UNIT: 100 INJECTION, SOLUTION INTRAVENOUS; SUBCUTANEOUS at 18:46

## 2020-11-01 RX ADMIN — INSULIN LISPRO SCH UNIT: 100 INJECTION, SOLUTION INTRAVENOUS; SUBCUTANEOUS at 06:00

## 2020-11-01 RX ADMIN — SODIUM CHLORIDE SCH MLS/HR: 9 INJECTION, SOLUTION INTRAVENOUS at 05:30

## 2020-11-01 RX ADMIN — INSULIN LISPRO SCH UNIT: 100 INJECTION, SOLUTION INTRAVENOUS; SUBCUTANEOUS at 23:43

## 2020-11-01 RX ADMIN — NYSTATIN SCH ML: 500000 SUSPENSION ORAL at 23:44

## 2020-11-01 RX ADMIN — INSULIN LISPRO SCH UNIT: 100 INJECTION, SOLUTION INTRAVENOUS; SUBCUTANEOUS at 00:04

## 2020-11-01 RX ADMIN — TAZOBACTAM SODIUM AND PIPERACILLIN SODIUM SCH MLS/HR: 375; 3 INJECTION, SOLUTION INTRAVENOUS at 06:42

## 2020-11-01 RX ADMIN — NYSTATIN SCH ML: 500000 SUSPENSION ORAL at 12:44

## 2020-11-01 RX ADMIN — SODIUM CHLORIDE SCH MLS/HR: 9 INJECTION, SOLUTION INTRAVENOUS at 14:51

## 2020-11-01 RX ADMIN — INSULIN GLARGINE SCH UNITS: 100 INJECTION, SOLUTION SUBCUTANEOUS at 20:52

## 2020-11-01 RX ADMIN — VANCOMYCIN HYDROCHLORIDE SCH MLS/HR: 1 INJECTION, SOLUTION INTRAVENOUS at 12:44

## 2020-11-01 RX ADMIN — SODIUM CHLORIDE SCH MG: 900 INJECTION INTRAVENOUS at 07:57

## 2020-11-01 RX ADMIN — Medication SCH ML: at 15:16

## 2020-11-01 RX ADMIN — ASPIRIN 81 MG CHEWABLE TABLET SCH MG: 81 TABLET CHEWABLE at 07:57

## 2020-11-01 RX ADMIN — Medication SCH ML: at 07:40

## 2020-11-01 RX ADMIN — INSULIN LISPRO SCH UNIT: 100 INJECTION, SOLUTION INTRAVENOUS; SUBCUTANEOUS at 13:25

## 2020-11-01 RX ADMIN — TAZOBACTAM SODIUM AND PIPERACILLIN SODIUM SCH MLS/HR: 375; 3 INJECTION, SOLUTION INTRAVENOUS at 17:25

## 2020-11-01 RX ADMIN — NYSTATIN SCH ML: 500000 SUSPENSION ORAL at 17:25

## 2020-11-01 RX ADMIN — Medication SCH ML: at 00:01

## 2020-11-02 VITALS — DIASTOLIC BLOOD PRESSURE: 69 MMHG | SYSTOLIC BLOOD PRESSURE: 149 MMHG

## 2020-11-02 VITALS — DIASTOLIC BLOOD PRESSURE: 70 MMHG | SYSTOLIC BLOOD PRESSURE: 151 MMHG

## 2020-11-02 VITALS — SYSTOLIC BLOOD PRESSURE: 153 MMHG | DIASTOLIC BLOOD PRESSURE: 79 MMHG

## 2020-11-02 VITALS — SYSTOLIC BLOOD PRESSURE: 162 MMHG | DIASTOLIC BLOOD PRESSURE: 73 MMHG

## 2020-11-02 VITALS — SYSTOLIC BLOOD PRESSURE: 167 MMHG | DIASTOLIC BLOOD PRESSURE: 88 MMHG

## 2020-11-02 VITALS — DIASTOLIC BLOOD PRESSURE: 69 MMHG | SYSTOLIC BLOOD PRESSURE: 153 MMHG

## 2020-11-02 VITALS — DIASTOLIC BLOOD PRESSURE: 73 MMHG | SYSTOLIC BLOOD PRESSURE: 159 MMHG

## 2020-11-02 VITALS — SYSTOLIC BLOOD PRESSURE: 171 MMHG | DIASTOLIC BLOOD PRESSURE: 93 MMHG

## 2020-11-02 VITALS — SYSTOLIC BLOOD PRESSURE: 153 MMHG | DIASTOLIC BLOOD PRESSURE: 66 MMHG

## 2020-11-02 VITALS — SYSTOLIC BLOOD PRESSURE: 171 MMHG | DIASTOLIC BLOOD PRESSURE: 94 MMHG

## 2020-11-02 VITALS — SYSTOLIC BLOOD PRESSURE: 152 MMHG | DIASTOLIC BLOOD PRESSURE: 73 MMHG

## 2020-11-02 VITALS — DIASTOLIC BLOOD PRESSURE: 61 MMHG | SYSTOLIC BLOOD PRESSURE: 130 MMHG

## 2020-11-02 VITALS — SYSTOLIC BLOOD PRESSURE: 135 MMHG | DIASTOLIC BLOOD PRESSURE: 62 MMHG

## 2020-11-02 VITALS — DIASTOLIC BLOOD PRESSURE: 66 MMHG | SYSTOLIC BLOOD PRESSURE: 153 MMHG

## 2020-11-02 VITALS — DIASTOLIC BLOOD PRESSURE: 69 MMHG | SYSTOLIC BLOOD PRESSURE: 156 MMHG

## 2020-11-02 LAB
ANION GAP SERPL CALC-SCNC: 11.3 MMOL/L (ref 8–16)
BASOPHILS # BLD AUTO: 0.1 10*3/UL (ref 0–0.1)
BASOPHILS NFR BLD AUTO: 0.4 % (ref 0–1)
BUN SERPL-MCNC: 6 MG/DL (ref 7–26)
BUN/CREAT SERPL: 12 (ref 6–25)
CALCIUM SERPL-MCNC: 9.1 MG/DL (ref 8.4–10.2)
CHLORIDE SERPL-SCNC: 114 MMOL/L (ref 98–107)
CO2 SERPL-SCNC: 19 MMOL/L (ref 22–29)
DEPRECATED NEUTROPHILS # BLD AUTO: 13.5 10*3/UL (ref 2.1–6.9)
DEPRECATED PHOSPHATE SERPL-MCNC: 2.5 MG/DL (ref 2.3–4.7)
EGFRCR SERPLBLD CKD-EPI 2021: > 60 ML/MIN (ref 60–?)
EOSINOPHIL # BLD AUTO: 0.3 10*3/UL (ref 0–0.4)
EOSINOPHIL NFR BLD AUTO: 1.9 % (ref 0–6)
ERYTHROCYTE [DISTWIDTH] IN CORD BLOOD: 17.3 % (ref 11.7–14.4)
GLUCOSE SERPLBLD-MCNC: 130 MG/DL (ref 74–118)
HCT VFR BLD AUTO: 23.3 % (ref 34.2–44.1)
HGB BLD-MCNC: 7 G/DL (ref 12–16)
LYMPHOCYTES # BLD: 1.3 10*3/UL (ref 1–3.2)
LYMPHOCYTES NFR BLD AUTO: 7.6 % (ref 18–39.1)
MAGNESIUM SERPL-MCNC: 1.5 MG/DL (ref 1.3–2.1)
MCH RBC QN AUTO: 24.5 PG (ref 28–32)
MCHC RBC AUTO-ENTMCNC: 30 G/DL (ref 31–35)
MCV RBC AUTO: 81.5 FL (ref 81–99)
MONOCYTES # BLD AUTO: 1.1 10*3/UL (ref 0.2–0.8)
MONOCYTES NFR BLD AUTO: 6.8 % (ref 4.4–11.3)
NEUTS SEG NFR BLD AUTO: 81.7 % (ref 38.7–80)
PLATELET # BLD AUTO: 269 X10E3/UL (ref 140–360)
POTASSIUM SERPL-SCNC: 3.3 MMOL/L (ref 3.5–5.1)
RBC # BLD AUTO: 2.86 X10E6/UL (ref 3.6–5.1)
SODIUM SERPL-SCNC: 141 MMOL/L (ref 136–145)

## 2020-11-02 RX ADMIN — NYSTATIN SCH ML: 500000 SUSPENSION ORAL at 11:07

## 2020-11-02 RX ADMIN — INSULIN LISPRO SCH UNIT: 100 INJECTION, SOLUTION INTRAVENOUS; SUBCUTANEOUS at 18:00

## 2020-11-02 RX ADMIN — Medication SCH ML: at 16:00

## 2020-11-02 RX ADMIN — INSULIN LISPRO SCH UNIT: 100 INJECTION, SOLUTION INTRAVENOUS; SUBCUTANEOUS at 06:11

## 2020-11-02 RX ADMIN — Medication SCH ML: at 07:00

## 2020-11-02 RX ADMIN — AMANTADINE HYDROCHLORIDE SCH MG: 100 CAPSULE, GELATIN COATED ORAL at 16:25

## 2020-11-02 RX ADMIN — TAZOBACTAM SODIUM AND PIPERACILLIN SODIUM SCH MLS/HR: 375; 3 INJECTION, SOLUTION INTRAVENOUS at 11:07

## 2020-11-02 RX ADMIN — SODIUM CHLORIDE SCH MLS/HR: 9 INJECTION, SOLUTION INTRAVENOUS at 14:31

## 2020-11-02 RX ADMIN — VANCOMYCIN HYDROCHLORIDE SCH MLS/HR: 1 INJECTION, SOLUTION INTRAVENOUS at 09:17

## 2020-11-02 RX ADMIN — SODIUM CHLORIDE SCH MLS/HR: 9 INJECTION, SOLUTION INTRAVENOUS at 06:11

## 2020-11-02 RX ADMIN — SODIUM CHLORIDE SCH MLS/HR: 9 INJECTION, SOLUTION INTRAVENOUS at 01:26

## 2020-11-02 RX ADMIN — NYSTATIN SCH ML: 500000 SUSPENSION ORAL at 17:00

## 2020-11-02 RX ADMIN — Medication SCH ML: at 23:00

## 2020-11-02 RX ADMIN — TAZOBACTAM SODIUM AND PIPERACILLIN SODIUM SCH MLS/HR: 375; 3 INJECTION, SOLUTION INTRAVENOUS at 17:00

## 2020-11-02 RX ADMIN — SODIUM CHLORIDE SCH MG: 900 INJECTION INTRAVENOUS at 09:17

## 2020-11-02 RX ADMIN — HYDRALAZINE HYDROCHLORIDE PRN MG: 20 INJECTION INTRAMUSCULAR; INTRAVENOUS at 21:20

## 2020-11-02 RX ADMIN — SODIUM CHLORIDE SCH MLS/HR: 9 INJECTION, SOLUTION INTRAVENOUS at 21:54

## 2020-11-02 RX ADMIN — TAZOBACTAM SODIUM AND PIPERACILLIN SODIUM SCH MLS/HR: 375; 3 INJECTION, SOLUTION INTRAVENOUS at 06:11

## 2020-11-02 RX ADMIN — AMANTADINE HYDROCHLORIDE SCH MG: 100 CAPSULE, GELATIN COATED ORAL at 09:17

## 2020-11-02 RX ADMIN — INSULIN GLARGINE SCH UNITS: 100 INJECTION, SOLUTION SUBCUTANEOUS at 21:26

## 2020-11-02 RX ADMIN — ASPIRIN 81 MG CHEWABLE TABLET SCH MG: 81 TABLET CHEWABLE at 09:17

## 2020-11-02 RX ADMIN — INSULIN LISPRO SCH UNIT: 100 INJECTION, SOLUTION INTRAVENOUS; SUBCUTANEOUS at 12:12

## 2020-11-02 RX ADMIN — NYSTATIN SCH ML: 500000 SUSPENSION ORAL at 06:11

## 2020-11-03 VITALS — SYSTOLIC BLOOD PRESSURE: 146 MMHG | DIASTOLIC BLOOD PRESSURE: 74 MMHG

## 2020-11-03 VITALS — SYSTOLIC BLOOD PRESSURE: 142 MMHG | DIASTOLIC BLOOD PRESSURE: 72 MMHG

## 2020-11-03 VITALS — SYSTOLIC BLOOD PRESSURE: 134 MMHG | DIASTOLIC BLOOD PRESSURE: 61 MMHG

## 2020-11-03 VITALS — SYSTOLIC BLOOD PRESSURE: 162 MMHG | DIASTOLIC BLOOD PRESSURE: 78 MMHG

## 2020-11-03 VITALS — SYSTOLIC BLOOD PRESSURE: 157 MMHG | DIASTOLIC BLOOD PRESSURE: 71 MMHG

## 2020-11-03 VITALS — DIASTOLIC BLOOD PRESSURE: 62 MMHG | SYSTOLIC BLOOD PRESSURE: 151 MMHG

## 2020-11-03 VITALS — DIASTOLIC BLOOD PRESSURE: 71 MMHG | SYSTOLIC BLOOD PRESSURE: 152 MMHG

## 2020-11-03 VITALS — SYSTOLIC BLOOD PRESSURE: 178 MMHG | DIASTOLIC BLOOD PRESSURE: 86 MMHG

## 2020-11-03 VITALS — DIASTOLIC BLOOD PRESSURE: 73 MMHG | SYSTOLIC BLOOD PRESSURE: 160 MMHG

## 2020-11-03 VITALS — DIASTOLIC BLOOD PRESSURE: 72 MMHG | SYSTOLIC BLOOD PRESSURE: 142 MMHG

## 2020-11-03 VITALS — DIASTOLIC BLOOD PRESSURE: 86 MMHG | SYSTOLIC BLOOD PRESSURE: 178 MMHG

## 2020-11-03 VITALS — DIASTOLIC BLOOD PRESSURE: 69 MMHG | SYSTOLIC BLOOD PRESSURE: 152 MMHG

## 2020-11-03 VITALS — DIASTOLIC BLOOD PRESSURE: 76 MMHG | SYSTOLIC BLOOD PRESSURE: 151 MMHG

## 2020-11-03 VITALS — DIASTOLIC BLOOD PRESSURE: 67 MMHG | SYSTOLIC BLOOD PRESSURE: 154 MMHG

## 2020-11-03 VITALS — SYSTOLIC BLOOD PRESSURE: 139 MMHG | DIASTOLIC BLOOD PRESSURE: 63 MMHG

## 2020-11-03 VITALS — SYSTOLIC BLOOD PRESSURE: 142 MMHG | DIASTOLIC BLOOD PRESSURE: 58 MMHG

## 2020-11-03 VITALS — DIASTOLIC BLOOD PRESSURE: 68 MMHG | SYSTOLIC BLOOD PRESSURE: 142 MMHG

## 2020-11-03 LAB
ALBUMIN SERPL-MCNC: 1.4 G/DL (ref 3.5–5)
ALBUMIN/GLOB SERPL: 0.3 {RATIO} (ref 0.8–2)
ALP SERPL-CCNC: 66 IU/L (ref 40–150)
ALT SERPL-CCNC: 12 IU/L (ref 0–55)
ANION GAP SERPL CALC-SCNC: 8.2 MMOL/L (ref 8–16)
BASOPHILS # BLD AUTO: 0 10*3/UL (ref 0–0.1)
BASOPHILS NFR BLD AUTO: 0.3 % (ref 0–1)
BUN SERPL-MCNC: 5 MG/DL (ref 7–26)
BUN/CREAT SERPL: 12 (ref 6–25)
CALCIUM SERPL-MCNC: 8.7 MG/DL (ref 8.4–10.2)
CHLORIDE SERPL-SCNC: 115 MMOL/L (ref 98–107)
CO2 SERPL-SCNC: 20 MMOL/L (ref 22–29)
DEPRECATED NEUTROPHILS # BLD AUTO: 11.5 10*3/UL (ref 2.1–6.9)
EGFRCR SERPLBLD CKD-EPI 2021: > 60 ML/MIN (ref 60–?)
EOSINOPHIL # BLD AUTO: 0.3 10*3/UL (ref 0–0.4)
EOSINOPHIL NFR BLD AUTO: 2.2 % (ref 0–6)
ERYTHROCYTE [DISTWIDTH] IN CORD BLOOD: 17.7 % (ref 11.7–14.4)
GLOBULIN PLAS-MCNC: 5.1 G/DL (ref 2.3–3.5)
GLUCOSE SERPLBLD-MCNC: 117 MG/DL (ref 74–118)
HCT VFR BLD AUTO: 24.5 % (ref 34.2–44.1)
HGB BLD-MCNC: 7.4 G/DL (ref 12–16)
LYMPHOCYTES # BLD: 1 10*3/UL (ref 1–3.2)
LYMPHOCYTES NFR BLD AUTO: 7.4 % (ref 18–39.1)
MCH RBC QN AUTO: 24.5 PG (ref 28–32)
MCHC RBC AUTO-ENTMCNC: 30.2 G/DL (ref 31–35)
MCV RBC AUTO: 81.1 FL (ref 81–99)
MONOCYTES # BLD AUTO: 1 10*3/UL (ref 0.2–0.8)
MONOCYTES NFR BLD AUTO: 6.9 % (ref 4.4–11.3)
NEUTS SEG NFR BLD AUTO: 82.1 % (ref 38.7–80)
PLATELET # BLD AUTO: 273 X10E3/UL (ref 140–360)
POTASSIUM SERPL-SCNC: 3.2 MMOL/L (ref 3.5–5.1)
RBC # BLD AUTO: 3.02 X10E6/UL (ref 3.6–5.1)
SODIUM SERPL-SCNC: 140 MMOL/L (ref 136–145)

## 2020-11-03 RX ADMIN — AMANTADINE HYDROCHLORIDE SCH MG: 100 CAPSULE, GELATIN COATED ORAL at 16:40

## 2020-11-03 RX ADMIN — HYDRALAZINE HYDROCHLORIDE PRN MG: 20 INJECTION INTRAMUSCULAR; INTRAVENOUS at 19:05

## 2020-11-03 RX ADMIN — Medication SCH ML: at 07:15

## 2020-11-03 RX ADMIN — NYSTATIN SCH ML: 500000 SUSPENSION ORAL at 00:00

## 2020-11-03 RX ADMIN — SODIUM CHLORIDE SCH MLS/HR: 9 INJECTION, SOLUTION INTRAVENOUS at 06:31

## 2020-11-03 RX ADMIN — INSULIN LISPRO SCH UNIT: 100 INJECTION, SOLUTION INTRAVENOUS; SUBCUTANEOUS at 06:31

## 2020-11-03 RX ADMIN — ASPIRIN 81 MG CHEWABLE TABLET SCH MG: 81 TABLET CHEWABLE at 08:15

## 2020-11-03 RX ADMIN — VANCOMYCIN HYDROCHLORIDE SCH MLS/HR: 1 INJECTION, SOLUTION INTRAVENOUS at 08:15

## 2020-11-03 RX ADMIN — AMANTADINE HYDROCHLORIDE SCH MG: 100 CAPSULE, GELATIN COATED ORAL at 08:15

## 2020-11-03 RX ADMIN — TAZOBACTAM SODIUM AND PIPERACILLIN SODIUM SCH MLS/HR: 375; 3 INJECTION, SOLUTION INTRAVENOUS at 00:00

## 2020-11-03 RX ADMIN — Medication SCH ML: at 13:20

## 2020-11-03 RX ADMIN — NYSTATIN SCH ML: 500000 SUSPENSION ORAL at 06:31

## 2020-11-03 RX ADMIN — Medication SCH ML: at 23:10

## 2020-11-03 RX ADMIN — SODIUM CHLORIDE SCH MLS/HR: 9 INJECTION, SOLUTION INTRAVENOUS at 23:52

## 2020-11-03 RX ADMIN — SODIUM CHLORIDE SCH MLS/HR: 9 INJECTION, SOLUTION INTRAVENOUS at 13:35

## 2020-11-03 RX ADMIN — INSULIN LISPRO SCH UNIT: 100 INJECTION, SOLUTION INTRAVENOUS; SUBCUTANEOUS at 18:00

## 2020-11-03 RX ADMIN — INSULIN LISPRO SCH UNIT: 100 INJECTION, SOLUTION INTRAVENOUS; SUBCUTANEOUS at 13:36

## 2020-11-03 RX ADMIN — TAZOBACTAM SODIUM AND PIPERACILLIN SODIUM SCH MLS/HR: 375; 3 INJECTION, SOLUTION INTRAVENOUS at 06:31

## 2020-11-03 RX ADMIN — NYSTATIN SCH ML: 500000 SUSPENSION ORAL at 12:00

## 2020-11-03 RX ADMIN — NYSTATIN SCH ML: 500000 SUSPENSION ORAL at 17:06

## 2020-11-03 RX ADMIN — INSULIN GLARGINE SCH UNITS: 100 INJECTION, SOLUTION SUBCUTANEOUS at 21:23

## 2020-11-03 RX ADMIN — INSULIN LISPRO SCH UNIT: 100 INJECTION, SOLUTION INTRAVENOUS; SUBCUTANEOUS at 01:18

## 2020-11-03 RX ADMIN — SODIUM CHLORIDE SCH MG: 900 INJECTION INTRAVENOUS at 08:15

## 2020-11-03 RX ADMIN — TAZOBACTAM SODIUM AND PIPERACILLIN SODIUM SCH MLS/HR: 375; 3 INJECTION, SOLUTION INTRAVENOUS at 17:06

## 2020-11-03 RX ADMIN — TAZOBACTAM SODIUM AND PIPERACILLIN SODIUM SCH MLS/HR: 375; 3 INJECTION, SOLUTION INTRAVENOUS at 12:00

## 2020-11-04 VITALS — SYSTOLIC BLOOD PRESSURE: 152 MMHG | DIASTOLIC BLOOD PRESSURE: 76 MMHG

## 2020-11-04 VITALS — SYSTOLIC BLOOD PRESSURE: 148 MMHG | DIASTOLIC BLOOD PRESSURE: 66 MMHG

## 2020-11-04 VITALS — DIASTOLIC BLOOD PRESSURE: 79 MMHG | SYSTOLIC BLOOD PRESSURE: 153 MMHG

## 2020-11-04 VITALS — DIASTOLIC BLOOD PRESSURE: 67 MMHG | SYSTOLIC BLOOD PRESSURE: 140 MMHG

## 2020-11-04 VITALS — DIASTOLIC BLOOD PRESSURE: 74 MMHG | SYSTOLIC BLOOD PRESSURE: 148 MMHG

## 2020-11-04 VITALS — DIASTOLIC BLOOD PRESSURE: 71 MMHG | SYSTOLIC BLOOD PRESSURE: 155 MMHG

## 2020-11-04 VITALS — SYSTOLIC BLOOD PRESSURE: 143 MMHG | DIASTOLIC BLOOD PRESSURE: 67 MMHG

## 2020-11-04 VITALS — SYSTOLIC BLOOD PRESSURE: 155 MMHG | DIASTOLIC BLOOD PRESSURE: 69 MMHG

## 2020-11-04 VITALS — SYSTOLIC BLOOD PRESSURE: 143 MMHG | DIASTOLIC BLOOD PRESSURE: 70 MMHG

## 2020-11-04 VITALS — DIASTOLIC BLOOD PRESSURE: 58 MMHG | SYSTOLIC BLOOD PRESSURE: 137 MMHG

## 2020-11-04 VITALS — SYSTOLIC BLOOD PRESSURE: 129 MMHG | DIASTOLIC BLOOD PRESSURE: 67 MMHG

## 2020-11-04 VITALS — SYSTOLIC BLOOD PRESSURE: 148 MMHG | DIASTOLIC BLOOD PRESSURE: 76 MMHG

## 2020-11-04 VITALS — SYSTOLIC BLOOD PRESSURE: 151 MMHG | DIASTOLIC BLOOD PRESSURE: 70 MMHG

## 2020-11-04 VITALS — SYSTOLIC BLOOD PRESSURE: 131 MMHG | DIASTOLIC BLOOD PRESSURE: 57 MMHG

## 2020-11-04 VITALS — SYSTOLIC BLOOD PRESSURE: 154 MMHG | DIASTOLIC BLOOD PRESSURE: 68 MMHG

## 2020-11-04 VITALS — SYSTOLIC BLOOD PRESSURE: 164 MMHG | DIASTOLIC BLOOD PRESSURE: 64 MMHG

## 2020-11-04 VITALS — SYSTOLIC BLOOD PRESSURE: 156 MMHG | DIASTOLIC BLOOD PRESSURE: 75 MMHG

## 2020-11-04 VITALS — DIASTOLIC BLOOD PRESSURE: 60 MMHG | SYSTOLIC BLOOD PRESSURE: 149 MMHG

## 2020-11-04 VITALS — DIASTOLIC BLOOD PRESSURE: 72 MMHG | SYSTOLIC BLOOD PRESSURE: 148 MMHG

## 2020-11-04 VITALS — SYSTOLIC BLOOD PRESSURE: 148 MMHG | DIASTOLIC BLOOD PRESSURE: 81 MMHG

## 2020-11-04 VITALS — DIASTOLIC BLOOD PRESSURE: 76 MMHG | SYSTOLIC BLOOD PRESSURE: 152 MMHG

## 2020-11-04 VITALS — DIASTOLIC BLOOD PRESSURE: 60 MMHG | SYSTOLIC BLOOD PRESSURE: 137 MMHG

## 2020-11-04 VITALS — DIASTOLIC BLOOD PRESSURE: 69 MMHG | SYSTOLIC BLOOD PRESSURE: 143 MMHG

## 2020-11-04 VITALS — DIASTOLIC BLOOD PRESSURE: 74 MMHG | SYSTOLIC BLOOD PRESSURE: 151 MMHG

## 2020-11-04 LAB
ANION GAP SERPL CALC-SCNC: 9 MMOL/L (ref 8–16)
BASOPHILS # BLD AUTO: 0 10*3/UL (ref 0–0.1)
BASOPHILS NFR BLD AUTO: 0.2 % (ref 0–1)
BUN SERPL-MCNC: < 5 MG/DL (ref 7–26)
BUN/CREAT SERPL: 13 (ref 6–25)
CALCIUM SERPL-MCNC: 8 MG/DL (ref 8.4–10.2)
CHLORIDE SERPL-SCNC: 118 MMOL/L (ref 98–107)
CO2 SERPL-SCNC: 18 MMOL/L (ref 22–29)
DEPRECATED NEUTROPHILS # BLD AUTO: 14.1 10*3/UL (ref 2.1–6.9)
EGFRCR SERPLBLD CKD-EPI 2021: > 60 ML/MIN (ref 60–?)
EOSINOPHIL # BLD AUTO: 0.2 10*3/UL (ref 0–0.4)
EOSINOPHIL NFR BLD AUTO: 1.5 % (ref 0–6)
ERYTHROCYTE [DISTWIDTH] IN CORD BLOOD: 17.5 % (ref 11.7–14.4)
GLUCOSE SERPLBLD-MCNC: 100 MG/DL (ref 74–118)
HCT VFR BLD AUTO: 23.9 % (ref 34.2–44.1)
HGB BLD-MCNC: 7.4 G/DL (ref 12–16)
LYMPHOCYTES # BLD: 1.1 10*3/UL (ref 1–3.2)
LYMPHOCYTES NFR BLD AUTO: 6.6 % (ref 18–39.1)
MCH RBC QN AUTO: 24.8 PG (ref 28–32)
MCHC RBC AUTO-ENTMCNC: 31 G/DL (ref 31–35)
MCV RBC AUTO: 80.2 FL (ref 81–99)
MONOCYTES # BLD AUTO: 0.8 10*3/UL (ref 0.2–0.8)
MONOCYTES NFR BLD AUTO: 5.1 % (ref 4.4–11.3)
NEUTS SEG NFR BLD AUTO: 85.6 % (ref 38.7–80)
PLATELET # BLD AUTO: 342 X10E3/UL (ref 140–360)
POTASSIUM SERPL-SCNC: 3 MMOL/L (ref 3.5–5.1)
RBC # BLD AUTO: 2.98 X10E6/UL (ref 3.6–5.1)
SODIUM SERPL-SCNC: 142 MMOL/L (ref 136–145)

## 2020-11-04 RX ADMIN — SODIUM CHLORIDE SCH MLS/HR: 9 INJECTION, SOLUTION INTRAVENOUS at 12:48

## 2020-11-04 RX ADMIN — TAZOBACTAM SODIUM AND PIPERACILLIN SODIUM SCH MLS/HR: 375; 3 INJECTION, SOLUTION INTRAVENOUS at 12:45

## 2020-11-04 RX ADMIN — NYSTATIN SCH ML: 500000 SUSPENSION ORAL at 00:07

## 2020-11-04 RX ADMIN — MEROPENEM SCH MLS/HR: 500 INJECTION INTRAVENOUS at 16:45

## 2020-11-04 RX ADMIN — INSULIN LISPRO SCH UNIT: 100 INJECTION, SOLUTION INTRAVENOUS; SUBCUTANEOUS at 05:50

## 2020-11-04 RX ADMIN — SODIUM CHLORIDE SCH MG: 900 INJECTION INTRAVENOUS at 08:50

## 2020-11-04 RX ADMIN — NYSTATIN SCH ML: 500000 SUSPENSION ORAL at 12:45

## 2020-11-04 RX ADMIN — AMANTADINE HYDROCHLORIDE SCH MG: 50 SOLUTION ORAL at 16:45

## 2020-11-04 RX ADMIN — Medication SCH ML: at 07:15

## 2020-11-04 RX ADMIN — POTASSIUM CHLORIDE PRN MEQ: 1.5 SOLUTION ORAL at 09:01

## 2020-11-04 RX ADMIN — TAZOBACTAM SODIUM AND PIPERACILLIN SODIUM SCH MLS/HR: 375; 3 INJECTION, SOLUTION INTRAVENOUS at 05:50

## 2020-11-04 RX ADMIN — POTASSIUM CHLORIDE PRN MEQ: 1.5 SOLUTION ORAL at 05:50

## 2020-11-04 RX ADMIN — TAZOBACTAM SODIUM AND PIPERACILLIN SODIUM SCH MLS/HR: 375; 3 INJECTION, SOLUTION INTRAVENOUS at 00:07

## 2020-11-04 RX ADMIN — Medication SCH ML: at 12:40

## 2020-11-04 RX ADMIN — ASPIRIN 81 MG CHEWABLE TABLET SCH MG: 81 TABLET CHEWABLE at 08:51

## 2020-11-04 RX ADMIN — INSULIN LISPRO SCH UNIT: 100 INJECTION, SOLUTION INTRAVENOUS; SUBCUTANEOUS at 00:08

## 2020-11-04 RX ADMIN — INSULIN LISPRO SCH UNIT: 100 INJECTION, SOLUTION INTRAVENOUS; SUBCUTANEOUS at 12:47

## 2020-11-04 RX ADMIN — INSULIN LISPRO SCH UNIT: 100 INJECTION, SOLUTION INTRAVENOUS; SUBCUTANEOUS at 18:23

## 2020-11-04 RX ADMIN — VANCOMYCIN HYDROCHLORIDE SCH MLS/HR: 1 INJECTION, SOLUTION INTRAVENOUS at 08:50

## 2020-11-04 RX ADMIN — SODIUM CHLORIDE SCH MLS/HR: 9 INJECTION, SOLUTION INTRAVENOUS at 21:30

## 2020-11-04 RX ADMIN — SODIUM CHLORIDE SCH MLS/HR: 9 INJECTION, SOLUTION INTRAVENOUS at 05:50

## 2020-11-04 RX ADMIN — Medication SCH ML: at 23:55

## 2020-11-04 RX ADMIN — AMANTADINE HYDROCHLORIDE SCH MG: 50 SOLUTION ORAL at 08:50

## 2020-11-04 RX ADMIN — INSULIN GLARGINE SCH UNITS: 100 INJECTION, SOLUTION SUBCUTANEOUS at 21:00

## 2020-11-04 RX ADMIN — NYSTATIN SCH ML: 500000 SUSPENSION ORAL at 16:46

## 2020-11-04 RX ADMIN — NYSTATIN SCH ML: 500000 SUSPENSION ORAL at 05:50

## 2020-11-05 VITALS — SYSTOLIC BLOOD PRESSURE: 151 MMHG | DIASTOLIC BLOOD PRESSURE: 75 MMHG

## 2020-11-05 VITALS — DIASTOLIC BLOOD PRESSURE: 74 MMHG | SYSTOLIC BLOOD PRESSURE: 158 MMHG

## 2020-11-05 VITALS — DIASTOLIC BLOOD PRESSURE: 65 MMHG | SYSTOLIC BLOOD PRESSURE: 145 MMHG

## 2020-11-05 VITALS — SYSTOLIC BLOOD PRESSURE: 150 MMHG | DIASTOLIC BLOOD PRESSURE: 66 MMHG

## 2020-11-05 VITALS — DIASTOLIC BLOOD PRESSURE: 84 MMHG | SYSTOLIC BLOOD PRESSURE: 156 MMHG

## 2020-11-05 VITALS — SYSTOLIC BLOOD PRESSURE: 164 MMHG | DIASTOLIC BLOOD PRESSURE: 91 MMHG

## 2020-11-05 VITALS — DIASTOLIC BLOOD PRESSURE: 79 MMHG | SYSTOLIC BLOOD PRESSURE: 155 MMHG

## 2020-11-05 VITALS — SYSTOLIC BLOOD PRESSURE: 174 MMHG | DIASTOLIC BLOOD PRESSURE: 90 MMHG

## 2020-11-05 VITALS — SYSTOLIC BLOOD PRESSURE: 158 MMHG | DIASTOLIC BLOOD PRESSURE: 67 MMHG

## 2020-11-05 VITALS — SYSTOLIC BLOOD PRESSURE: 148 MMHG | DIASTOLIC BLOOD PRESSURE: 72 MMHG

## 2020-11-05 VITALS — SYSTOLIC BLOOD PRESSURE: 164 MMHG | DIASTOLIC BLOOD PRESSURE: 78 MMHG

## 2020-11-05 VITALS — DIASTOLIC BLOOD PRESSURE: 73 MMHG | SYSTOLIC BLOOD PRESSURE: 142 MMHG

## 2020-11-05 VITALS — SYSTOLIC BLOOD PRESSURE: 154 MMHG | DIASTOLIC BLOOD PRESSURE: 74 MMHG

## 2020-11-05 VITALS — SYSTOLIC BLOOD PRESSURE: 156 MMHG | DIASTOLIC BLOOD PRESSURE: 84 MMHG

## 2020-11-05 VITALS — DIASTOLIC BLOOD PRESSURE: 67 MMHG | SYSTOLIC BLOOD PRESSURE: 151 MMHG

## 2020-11-05 VITALS — DIASTOLIC BLOOD PRESSURE: 95 MMHG | SYSTOLIC BLOOD PRESSURE: 171 MMHG

## 2020-11-05 VITALS — SYSTOLIC BLOOD PRESSURE: 139 MMHG | DIASTOLIC BLOOD PRESSURE: 63 MMHG

## 2020-11-05 VITALS — SYSTOLIC BLOOD PRESSURE: 138 MMHG | DIASTOLIC BLOOD PRESSURE: 61 MMHG

## 2020-11-05 VITALS — DIASTOLIC BLOOD PRESSURE: 82 MMHG | SYSTOLIC BLOOD PRESSURE: 158 MMHG

## 2020-11-05 VITALS — DIASTOLIC BLOOD PRESSURE: 77 MMHG | SYSTOLIC BLOOD PRESSURE: 158 MMHG

## 2020-11-05 VITALS — DIASTOLIC BLOOD PRESSURE: 75 MMHG | SYSTOLIC BLOOD PRESSURE: 142 MMHG

## 2020-11-05 VITALS — SYSTOLIC BLOOD PRESSURE: 155 MMHG | DIASTOLIC BLOOD PRESSURE: 79 MMHG

## 2020-11-05 VITALS — SYSTOLIC BLOOD PRESSURE: 176 MMHG | DIASTOLIC BLOOD PRESSURE: 99 MMHG

## 2020-11-05 VITALS — SYSTOLIC BLOOD PRESSURE: 156 MMHG | DIASTOLIC BLOOD PRESSURE: 75 MMHG

## 2020-11-05 LAB
ANION GAP SERPL CALC-SCNC: 9.7 MMOL/L (ref 8–16)
BASOPHILS # BLD AUTO: 0 10*3/UL (ref 0–0.1)
BASOPHILS NFR BLD AUTO: 0.3 % (ref 0–1)
BUN SERPL-MCNC: < 5 MG/DL (ref 7–26)
BUN/CREAT SERPL: 12 (ref 6–25)
CALCIUM SERPL-MCNC: 8.4 MG/DL (ref 8.4–10.2)
CHLORIDE SERPL-SCNC: 117 MMOL/L (ref 98–107)
CO2 SERPL-SCNC: 19 MMOL/L (ref 22–29)
DEPRECATED NEUTROPHILS # BLD AUTO: 10 10*3/UL (ref 2.1–6.9)
EGFRCR SERPLBLD CKD-EPI 2021: > 60 ML/MIN (ref 60–?)
EOSINOPHIL # BLD AUTO: 0.2 10*3/UL (ref 0–0.4)
EOSINOPHIL NFR BLD AUTO: 1.3 % (ref 0–6)
ERYTHROCYTE [DISTWIDTH] IN CORD BLOOD: 17.9 % (ref 11.7–14.4)
GLUCOSE SERPLBLD-MCNC: 117 MG/DL (ref 74–118)
HCT VFR BLD AUTO: 22.4 % (ref 34.2–44.1)
HGB BLD-MCNC: 7 G/DL (ref 12–16)
LYMPHOCYTES # BLD: 0.9 10*3/UL (ref 1–3.2)
LYMPHOCYTES NFR BLD AUTO: 7.4 % (ref 18–39.1)
MCH RBC QN AUTO: 25.6 PG (ref 28–32)
MCHC RBC AUTO-ENTMCNC: 31.3 G/DL (ref 31–35)
MCV RBC AUTO: 82.1 FL (ref 81–99)
MONOCYTES # BLD AUTO: 0.8 10*3/UL (ref 0.2–0.8)
MONOCYTES NFR BLD AUTO: 6.6 % (ref 4.4–11.3)
NEUTS SEG NFR BLD AUTO: 83.6 % (ref 38.7–80)
PLATELET # BLD AUTO: 326 X10E3/UL (ref 140–360)
POTASSIUM SERPL-SCNC: 3.7 MMOL/L (ref 3.5–5.1)
RBC # BLD AUTO: 2.73 X10E6/UL (ref 3.6–5.1)
SODIUM SERPL-SCNC: 142 MMOL/L (ref 136–145)

## 2020-11-05 RX ADMIN — Medication SCH ML: at 08:05

## 2020-11-05 RX ADMIN — AMANTADINE HYDROCHLORIDE SCH MG: 50 SOLUTION ORAL at 16:34

## 2020-11-05 RX ADMIN — MEROPENEM SCH MLS/HR: 500 INJECTION INTRAVENOUS at 16:34

## 2020-11-05 RX ADMIN — INSULIN LISPRO SCH UNIT: 100 INJECTION, SOLUTION INTRAVENOUS; SUBCUTANEOUS at 05:45

## 2020-11-05 RX ADMIN — Medication SCH ML: at 23:10

## 2020-11-05 RX ADMIN — NYSTATIN SCH ML: 500000 SUSPENSION ORAL at 00:55

## 2020-11-05 RX ADMIN — VANCOMYCIN HYDROCHLORIDE SCH MLS/HR: 1 INJECTION, SOLUTION INTRAVENOUS at 10:00

## 2020-11-05 RX ADMIN — AMANTADINE HYDROCHLORIDE SCH MG: 50 SOLUTION ORAL at 09:25

## 2020-11-05 RX ADMIN — INSULIN LISPRO SCH UNIT: 100 INJECTION, SOLUTION INTRAVENOUS; SUBCUTANEOUS at 00:57

## 2020-11-05 RX ADMIN — MEROPENEM SCH MLS/HR: 500 INJECTION INTRAVENOUS at 09:25

## 2020-11-05 RX ADMIN — MEROPENEM SCH MLS/HR: 500 INJECTION INTRAVENOUS at 00:55

## 2020-11-05 RX ADMIN — NYSTATIN SCH ML: 500000 SUSPENSION ORAL at 05:45

## 2020-11-05 RX ADMIN — ASPIRIN 81 MG CHEWABLE TABLET SCH MG: 81 TABLET CHEWABLE at 09:25

## 2020-11-05 RX ADMIN — Medication SCH ML: at 14:25

## 2020-11-05 RX ADMIN — INSULIN LISPRO SCH UNIT: 100 INJECTION, SOLUTION INTRAVENOUS; SUBCUTANEOUS at 13:08

## 2020-11-05 RX ADMIN — INSULIN GLARGINE SCH UNITS: 100 INJECTION, SOLUTION SUBCUTANEOUS at 21:21

## 2020-11-05 RX ADMIN — INSULIN LISPRO SCH UNIT: 100 INJECTION, SOLUTION INTRAVENOUS; SUBCUTANEOUS at 18:28

## 2020-11-05 RX ADMIN — SODIUM CHLORIDE SCH MG: 900 INJECTION INTRAVENOUS at 09:25

## 2020-11-05 RX ADMIN — SODIUM CHLORIDE SCH MLS/HR: 9 INJECTION, SOLUTION INTRAVENOUS at 03:00

## 2020-11-06 VITALS — SYSTOLIC BLOOD PRESSURE: 121 MMHG | DIASTOLIC BLOOD PRESSURE: 68 MMHG

## 2020-11-06 VITALS — DIASTOLIC BLOOD PRESSURE: 82 MMHG | SYSTOLIC BLOOD PRESSURE: 163 MMHG

## 2020-11-06 VITALS — DIASTOLIC BLOOD PRESSURE: 56 MMHG | SYSTOLIC BLOOD PRESSURE: 110 MMHG

## 2020-11-06 VITALS — DIASTOLIC BLOOD PRESSURE: 77 MMHG | SYSTOLIC BLOOD PRESSURE: 152 MMHG

## 2020-11-06 VITALS — DIASTOLIC BLOOD PRESSURE: 83 MMHG | SYSTOLIC BLOOD PRESSURE: 153 MMHG

## 2020-11-06 VITALS — SYSTOLIC BLOOD PRESSURE: 138 MMHG | DIASTOLIC BLOOD PRESSURE: 77 MMHG

## 2020-11-06 VITALS — DIASTOLIC BLOOD PRESSURE: 77 MMHG | SYSTOLIC BLOOD PRESSURE: 142 MMHG

## 2020-11-06 VITALS — SYSTOLIC BLOOD PRESSURE: 148 MMHG | DIASTOLIC BLOOD PRESSURE: 76 MMHG

## 2020-11-06 VITALS — SYSTOLIC BLOOD PRESSURE: 159 MMHG | DIASTOLIC BLOOD PRESSURE: 99 MMHG

## 2020-11-06 VITALS — DIASTOLIC BLOOD PRESSURE: 84 MMHG | SYSTOLIC BLOOD PRESSURE: 139 MMHG

## 2020-11-06 VITALS — SYSTOLIC BLOOD PRESSURE: 168 MMHG | DIASTOLIC BLOOD PRESSURE: 91 MMHG

## 2020-11-06 VITALS — DIASTOLIC BLOOD PRESSURE: 85 MMHG | SYSTOLIC BLOOD PRESSURE: 176 MMHG

## 2020-11-06 VITALS — DIASTOLIC BLOOD PRESSURE: 78 MMHG | SYSTOLIC BLOOD PRESSURE: 146 MMHG

## 2020-11-06 VITALS — SYSTOLIC BLOOD PRESSURE: 171 MMHG | DIASTOLIC BLOOD PRESSURE: 83 MMHG

## 2020-11-06 VITALS — DIASTOLIC BLOOD PRESSURE: 80 MMHG | SYSTOLIC BLOOD PRESSURE: 146 MMHG

## 2020-11-06 VITALS — DIASTOLIC BLOOD PRESSURE: 78 MMHG | SYSTOLIC BLOOD PRESSURE: 139 MMHG

## 2020-11-06 VITALS — DIASTOLIC BLOOD PRESSURE: 65 MMHG | SYSTOLIC BLOOD PRESSURE: 123 MMHG

## 2020-11-06 VITALS — SYSTOLIC BLOOD PRESSURE: 163 MMHG | DIASTOLIC BLOOD PRESSURE: 94 MMHG

## 2020-11-06 VITALS — SYSTOLIC BLOOD PRESSURE: 173 MMHG | DIASTOLIC BLOOD PRESSURE: 93 MMHG

## 2020-11-06 VITALS — SYSTOLIC BLOOD PRESSURE: 139 MMHG | DIASTOLIC BLOOD PRESSURE: 69 MMHG

## 2020-11-06 LAB
ANION GAP SERPL CALC-SCNC: 9.5 MMOL/L (ref 8–16)
BASOPHILS # BLD AUTO: 0 10*3/UL (ref 0–0.1)
BASOPHILS NFR BLD AUTO: 0.3 % (ref 0–1)
BUN SERPL-MCNC: < 5 MG/DL (ref 7–26)
BUN/CREAT SERPL: 11 (ref 6–25)
CALCIUM SERPL-MCNC: 8.7 MG/DL (ref 8.4–10.2)
CHLORIDE SERPL-SCNC: 113 MMOL/L (ref 98–107)
CO2 SERPL-SCNC: 21 MMOL/L (ref 22–29)
DEPRECATED NEUTROPHILS # BLD AUTO: 8.6 10*3/UL (ref 2.1–6.9)
EGFRCR SERPLBLD CKD-EPI 2021: > 60 ML/MIN (ref 60–?)
EOSINOPHIL # BLD AUTO: 0.2 10*3/UL (ref 0–0.4)
EOSINOPHIL NFR BLD AUTO: 2 % (ref 0–6)
ERYTHROCYTE [DISTWIDTH] IN CORD BLOOD: 17.8 % (ref 11.7–14.4)
GLUCOSE SERPLBLD-MCNC: 111 MG/DL (ref 74–118)
HCT VFR BLD AUTO: 24.1 % (ref 34.2–44.1)
HGB BLD-MCNC: 7.2 G/DL (ref 12–16)
LYMPHOCYTES # BLD: 1 10*3/UL (ref 1–3.2)
LYMPHOCYTES NFR BLD AUTO: 9.5 % (ref 18–39.1)
MCH RBC QN AUTO: 24.2 PG (ref 28–32)
MCHC RBC AUTO-ENTMCNC: 29.9 G/DL (ref 31–35)
MCV RBC AUTO: 81.1 FL (ref 81–99)
MONOCYTES # BLD AUTO: 0.9 10*3/UL (ref 0.2–0.8)
MONOCYTES NFR BLD AUTO: 7.9 % (ref 4.4–11.3)
NEUTS SEG NFR BLD AUTO: 79.6 % (ref 38.7–80)
PLATELET # BLD AUTO: 373 X10E3/UL (ref 140–360)
POTASSIUM SERPL-SCNC: 3.5 MMOL/L (ref 3.5–5.1)
RBC # BLD AUTO: 2.97 X10E6/UL (ref 3.6–5.1)
SODIUM SERPL-SCNC: 140 MMOL/L (ref 136–145)

## 2020-11-06 RX ADMIN — CARVEDILOL SCH MG: 12.5 TABLET, FILM COATED ORAL at 09:14

## 2020-11-06 RX ADMIN — INSULIN LISPRO SCH UNIT: 100 INJECTION, SOLUTION INTRAVENOUS; SUBCUTANEOUS at 12:23

## 2020-11-06 RX ADMIN — ASPIRIN 81 MG CHEWABLE TABLET SCH MG: 81 TABLET CHEWABLE at 09:13

## 2020-11-06 RX ADMIN — INSULIN LISPRO SCH UNIT: 100 INJECTION, SOLUTION INTRAVENOUS; SUBCUTANEOUS at 06:00

## 2020-11-06 RX ADMIN — Medication SCH ML: at 07:23

## 2020-11-06 RX ADMIN — MEROPENEM SCH MLS/HR: 500 INJECTION INTRAVENOUS at 01:01

## 2020-11-06 RX ADMIN — SODIUM CHLORIDE SCH MG: 900 INJECTION INTRAVENOUS at 09:13

## 2020-11-06 RX ADMIN — MEROPENEM SCH MLS/HR: 500 INJECTION INTRAVENOUS at 08:08

## 2020-11-06 RX ADMIN — AMANTADINE HYDROCHLORIDE SCH MG: 50 SOLUTION ORAL at 12:10

## 2020-11-06 RX ADMIN — AMANTADINE HYDROCHLORIDE SCH MG: 50 SOLUTION ORAL at 17:16

## 2020-11-06 RX ADMIN — INSULIN LISPRO SCH UNIT: 100 INJECTION, SOLUTION INTRAVENOUS; SUBCUTANEOUS at 01:01

## 2020-11-06 RX ADMIN — MEROPENEM SCH MLS/HR: 500 INJECTION INTRAVENOUS at 17:15

## 2020-11-06 RX ADMIN — INSULIN LISPRO SCH UNIT: 100 INJECTION, SOLUTION INTRAVENOUS; SUBCUTANEOUS at 18:00

## 2020-11-06 RX ADMIN — CARVEDILOL SCH MG: 12.5 TABLET, FILM COATED ORAL at 17:21

## 2020-11-06 RX ADMIN — HYDRALAZINE HYDROCHLORIDE PRN MG: 20 INJECTION INTRAMUSCULAR; INTRAVENOUS at 06:31

## 2020-11-06 RX ADMIN — Medication SCH ML: at 15:25

## 2020-11-25 ENCOUNTER — HOSPITAL ENCOUNTER (INPATIENT)
Dept: HOSPITAL 88 - ER | Age: 75
LOS: 6 days | Discharge: NURSING, REHAB OR LONG-TERM CARE | DRG: 698 | End: 2020-12-01
Attending: INTERNAL MEDICINE | Admitting: INTERNAL MEDICINE
Payer: MEDICARE

## 2020-11-25 VITALS — SYSTOLIC BLOOD PRESSURE: 101 MMHG | DIASTOLIC BLOOD PRESSURE: 57 MMHG

## 2020-11-25 VITALS — HEIGHT: 63 IN | WEIGHT: 139 LBS | BODY MASS INDEX: 24.63 KG/M2

## 2020-11-25 DIAGNOSIS — Z86.73: ICD-10-CM

## 2020-11-25 DIAGNOSIS — Z20.828: ICD-10-CM

## 2020-11-25 DIAGNOSIS — Z93.1: ICD-10-CM

## 2020-11-25 DIAGNOSIS — R40.3: ICD-10-CM

## 2020-11-25 DIAGNOSIS — N39.0: ICD-10-CM

## 2020-11-25 DIAGNOSIS — A41.9: ICD-10-CM

## 2020-11-25 DIAGNOSIS — Z66: ICD-10-CM

## 2020-11-25 DIAGNOSIS — T83.518A: Primary | ICD-10-CM

## 2020-11-25 DIAGNOSIS — I69.398: ICD-10-CM

## 2020-11-25 DIAGNOSIS — L89.154: ICD-10-CM

## 2020-11-25 DIAGNOSIS — Z93.0: ICD-10-CM

## 2020-11-25 DIAGNOSIS — Z86.74: ICD-10-CM

## 2020-11-25 DIAGNOSIS — G93.41: ICD-10-CM

## 2020-11-25 DIAGNOSIS — J15.9: ICD-10-CM

## 2020-11-25 DIAGNOSIS — L89.304: ICD-10-CM

## 2020-11-25 DIAGNOSIS — D63.8: ICD-10-CM

## 2020-11-25 LAB
ALBUMIN SERPL-MCNC: 1.7 G/DL (ref 3.5–5)
ALBUMIN/GLOB SERPL: 0.3 {RATIO} (ref 0.8–2)
ALP SERPL-CCNC: 105 IU/L (ref 40–150)
ALT SERPL-CCNC: 13 IU/L (ref 0–55)
ANION GAP SERPL CALC-SCNC: 15.1 MMOL/L (ref 8–16)
BACTERIA URNS QL MICRO: (no result) /HPF
BASOPHILS # BLD AUTO: 0 10*3/UL (ref 0–0.1)
BASOPHILS NFR BLD AUTO: 0.2 % (ref 0–1)
BILIRUB UR QL: NEGATIVE
BUN SERPL-MCNC: 24 MG/DL (ref 7–26)
BUN/CREAT SERPL: 42 (ref 6–25)
CALCIUM SERPL-MCNC: 9.5 MG/DL (ref 8.4–10.2)
CAOX CRY URNS QL MICRO: (no result)
CHLORIDE SERPL-SCNC: 99 MMOL/L (ref 98–107)
CK MB SERPL-MCNC: 2 NG/ML (ref 0–5)
CK SERPL-CCNC: 27 IU/L (ref 29–168)
CLARITY UR: (no result)
CO2 SERPL-SCNC: 25 MMOL/L (ref 22–29)
COLOR UR: YELLOW
DEPRECATED NEUTROPHILS # BLD AUTO: 14 10*3/UL (ref 2.1–6.9)
EGFRCR SERPLBLD CKD-EPI 2021: > 60 ML/MIN (ref 60–?)
EOSINOPHIL # BLD AUTO: 0.2 10*3/UL (ref 0–0.4)
EOSINOPHIL NFR BLD AUTO: 1.4 % (ref 0–6)
EPI CELLS URNS QL MICRO: (no result) /LPF
ERYTHROCYTE [DISTWIDTH] IN CORD BLOOD: 18.5 % (ref 11.7–14.4)
GLOBULIN PLAS-MCNC: 6.5 G/DL (ref 2.3–3.5)
GLUCOSE SERPLBLD-MCNC: 99 MG/DL (ref 74–118)
HCT VFR BLD AUTO: 21.9 % (ref 34.2–44.1)
HGB BLD-MCNC: 6.6 G/DL (ref 12–16)
KETONES UR QL STRIP.AUTO: NEGATIVE
LEUKOCYTE ESTERASE UR QL STRIP.AUTO: (no result)
LYMPHOCYTES # BLD: 0.8 10*3/UL (ref 1–3.2)
LYMPHOCYTES NFR BLD AUTO: 4.8 % (ref 18–39.1)
MCH RBC QN AUTO: 23.3 PG (ref 28–32)
MCHC RBC AUTO-ENTMCNC: 30.1 G/DL (ref 31–35)
MCV RBC AUTO: 77.4 FL (ref 81–99)
MONOCYTES # BLD AUTO: 0.8 10*3/UL (ref 0.2–0.8)
MONOCYTES NFR BLD AUTO: 5.2 % (ref 4.4–11.3)
NEUTS SEG NFR BLD AUTO: 87.5 % (ref 38.7–80)
NITRITE UR QL STRIP.AUTO: POSITIVE
PLATELET # BLD AUTO: 271 X10E3/UL (ref 140–360)
POTASSIUM SERPL-SCNC: 4.1 MMOL/L (ref 3.5–5.1)
PROT UR QL STRIP.AUTO: (no result)
RBC # BLD AUTO: 2.83 X10E6/UL (ref 3.6–5.1)
SODIUM SERPL-SCNC: 135 MMOL/L (ref 136–145)
SP GR UR STRIP: 1.01 (ref 1.01–1.02)
UROBILINOGEN UR STRIP-MCNC: 1 MG/DL (ref 0.2–1)

## 2020-11-25 PROCEDURE — 85025 COMPLETE CBC W/AUTO DIFF WBC: CPT

## 2020-11-25 PROCEDURE — 94003 VENT MGMT INPAT SUBQ DAY: CPT

## 2020-11-25 PROCEDURE — 36415 COLL VENOUS BLD VENIPUNCTURE: CPT

## 2020-11-25 PROCEDURE — 86900 BLOOD TYPING SEROLOGIC ABO: CPT

## 2020-11-25 PROCEDURE — 93005 ELECTROCARDIOGRAM TRACING: CPT

## 2020-11-25 PROCEDURE — 80053 COMPREHEN METABOLIC PANEL: CPT

## 2020-11-25 PROCEDURE — 87040 BLOOD CULTURE FOR BACTERIA: CPT

## 2020-11-25 PROCEDURE — 99251: CPT

## 2020-11-25 PROCEDURE — 99284 EMERGENCY DEPT VISIT MOD MDM: CPT

## 2020-11-25 PROCEDURE — 84484 ASSAY OF TROPONIN QUANT: CPT

## 2020-11-25 PROCEDURE — 94640 AIRWAY INHALATION TREATMENT: CPT

## 2020-11-25 PROCEDURE — 83605 ASSAY OF LACTIC ACID: CPT

## 2020-11-25 PROCEDURE — 80048 BASIC METABOLIC PNL TOTAL CA: CPT

## 2020-11-25 PROCEDURE — 81001 URINALYSIS AUTO W/SCOPE: CPT

## 2020-11-25 PROCEDURE — 36600 WITHDRAWAL OF ARTERIAL BLOOD: CPT

## 2020-11-25 PROCEDURE — 84466 ASSAY OF TRANSFERRIN: CPT

## 2020-11-25 PROCEDURE — 5A1955Z RESPIRATORY VENTILATION, GREATER THAN 96 CONSECUTIVE HOURS: ICD-10-PCS | Performed by: INTERNAL MEDICINE

## 2020-11-25 PROCEDURE — 83540 ASSAY OF IRON: CPT

## 2020-11-25 PROCEDURE — 86920 COMPATIBILITY TEST SPIN: CPT

## 2020-11-25 PROCEDURE — 97139 UNLISTED THERAPEUTIC PX: CPT

## 2020-11-25 PROCEDURE — 82805 BLOOD GASES W/O2 SATURATION: CPT

## 2020-11-25 PROCEDURE — 82550 ASSAY OF CK (CPK): CPT

## 2020-11-25 PROCEDURE — 94002 VENT MGMT INPAT INIT DAY: CPT

## 2020-11-25 PROCEDURE — 36569 INSJ PICC 5 YR+ W/O IMAGING: CPT

## 2020-11-25 PROCEDURE — 87186 SC STD MICRODIL/AGAR DIL: CPT

## 2020-11-25 PROCEDURE — 83880 ASSAY OF NATRIURETIC PEPTIDE: CPT

## 2020-11-25 PROCEDURE — 87400 INFLUENZA A/B EACH AG IA: CPT

## 2020-11-25 PROCEDURE — 87086 URINE CULTURE/COLONY COUNT: CPT

## 2020-11-25 PROCEDURE — 82553 CREATINE MB FRACTION: CPT

## 2020-11-25 PROCEDURE — 80202 ASSAY OF VANCOMYCIN: CPT

## 2020-11-25 PROCEDURE — 71045 X-RAY EXAM CHEST 1 VIEW: CPT

## 2020-11-25 PROCEDURE — 86850 RBC ANTIBODY SCREEN: CPT

## 2020-11-25 NOTE — EMERGENCY DEPARTMENT NOTE
History of Present Illnes


History of Present Illness


Chief Complaint:  General Medicine Complaints


History of Present Illness


This is a 75 year old Unknown female [facility] for evaluation of


   abnormal labs, pt reported to have elevated creatinine level, low Hgb level,


   elevated WBC levels, pt is a DNR, on a mechanical ventilator, no report from


   facility regarding COVID testing, pt has had a iraheta catheter for 2 weeks 

   with


   cloudy, sediment filled urine, PT HAS DNR PAPERWORK FROM NURSING HOME.


   PER RECORD FROM PT'S LAST ADMISSION TO THE FACILITY SHE WAS DNR THEN AS WELL.


Historian:  Paramedic/EMS


Arrival Mode:  [facility]


History limited by:  condition of the patient (PT IN PERSISTENT VEGATATIVE STATE

WHICH IS HER BASELINE)


 Required:  No


Onset (how long ago):  unknown


Location:  NONE


Quality:  PT IN VEGATATIVE STATE,


Progression:  unable to specify





Past Medical/Family History


Physician Review


I have reviewed the patient's past medical and family history.  Any updates have

been documented here.





Past Medical History


Recent Fever:  Yes


Clinical Suspicion of Infectio:  Yes


New/Unexplained Change in Ment:  No


Past Medical History:  Hypertension, Diabetes, CVA, A-Fib, Hypothyroidism, 

Hyperlipedemia


Other Medical History:  


TRACH(D/T RESP FAILURE)02 6 LPM


Other Surgery:  


TRACH(O2 DEPENDENT)





CRANIOTOMY





PEG





Social History


Unable to obtain PSH:  Unable to obtain due to, other (PT IN PERSISTENT 

VEGETATIVE STATE)





Review of Systems


ROS Narrative


Unable to obtain ROS:  Unable to obtain due to, other (PT IN PERSISTENT 

VEGETATIVE STATE)





Physical Exam


Related Data


Allergies:  


Coded Allergies:  


     No Known Allergies (Unverified , 10/15/20)


Triage Vital Signs





Vital Signs








  Date Time  Temp Pulse Resp B/P (MAP) Pulse Ox O2 Delivery O2 Flow Rate FiO2


 


11/25/20 19:38  90 30 135/78 96 Trach Collar  








Vital signs reviewed:  Yes





Physical Exam


CONSTITUTIONAL





Constitutional:  Present well-developed, Present well-nourished


HENT


HENT:  Present normocephalic, Present atraumatic, Present oropharynx 

clear/moist, Present nose normal


HENT L/R:  Present left ext ear normal, Present right ext ear normal


EYES





Eyes:  Reports PERRL, Reports conjunctivae normal


NECK


Neck:  Present ROM normal, Present supple, Present other (TRACH PRESENT)


PULMONARY


Pulmonary:  Present effort normal, Present rhonchi (THROUGHOUT)


CARDIOVASCULAR





Cardiovascular:  Present irregular rhythm, Present heart sounds normal, Present 

capillary refill normal, Present normal rate


GASTROINTESTINAL





Abdominal:  Present soft, Present nontender, Present bowel sounds normal


GENITOURINARY





Genitourinary:  Present exam deferred, Present other (INDWELLING IRAHETA CATHETER 

PRESENT ON ARRIVAL)


SKIN


Skin:  Present warm, Present dry


MUSCULOSKELETAL





Musculoskeletal:  Present ROM normal


NEUROLOGICAL





Neurological:  Present other (UNABLE TO ASSESS PT IN PERSISTENT VEGETATIVE 

STATE)


PSYCHOLOGICAL


Psychological:  Present other (UNABLE TO ASSESS PT IN PERSITENT VEGETATIVE 

STATE)





Results


Laboratory


Laboratory





Laboratory Tests








Test


 11/25/20


20:15 11/25/20


19:47 11/25/20


19:43


 


White Blood Count


 16.03 x10e3/uL


(4.8-10.8) 


 





 


Red Blood Count


 2.83 x10e6/uL


(3.6-5.1) 


 





 


Hemoglobin


 6.6 g/dL


(12.0-16.0) 


 





 


Hematocrit


 21.9 %


(34.2-44.1) 


 





 


Mean Corpuscular Volume


 77.4 fL


(81-99) 


 





 


Mean Corpuscular Hemoglobin


 23.3 pg


(28-32) 


 





 


Mean Corpuscular Hemoglobin


Concent 30.1 g/dL


(31-35) 


 





 


Red Cell Distribution Width


 18.5 %


(11.7-14.4) 


 





 


Platelet Count


 271 x10e3/uL


(140-360) 


 





 


Neutrophils (%) (Auto)


 87.5 %


(38.7-80.0) 


 





 


Lymphocytes (%) (Auto)


 4.8 %


(18.0-39.1) 


 





 


Monocytes (%) (Auto)


 5.2  %


(4.4-11.3) 


 





 


Eosinophils (%) (Auto)


 1.4 %


(0.0-6.0) 


 





 


Basophils (%) (Auto)


 0.2 %


(0.0-1.0) 


 





 


Neutrophils # (Auto) 14.0 (2.1-6.9)   


 


Lymphocytes # (Auto) 0.8 (1.0-3.2)   


 


Monocytes # (Auto) 0.8 (0.2-0.8)   


 


Eosinophils # (Auto) 0.2 (0.0-0.4)   


 


Basophils # (Auto) 0.0 (0.0-0.1)   


 


Absolute Immature Granulocyte


(auto 0.14 x10e3/uL


(0-0.1) 


 





 


Urine Color


 Yellow


(YELLOW) 


 





 


Urine Clarity Turbid (CLEAR)   


 


Urine pH 9 (5 - 7)   


 


Urine Specific Gravity


 1.015


(1.010-1.025) 


 





 


Urine Protein 1+ (NEGATIVE)   


 


Urine Glucose (UA)


 Negative


(NEGATIVE) 


 





 


Urine Ketones


 Negative


(NEGATIVE) 


 





 


Urine Blood


 Large


(NEGATIVE) 


 





 


Urine Nitrite


 Positive


(NEGATIVE) 


 





 


Urine Bilirubin


 Negative


(NEGATIVE) 


 





 


Urine Urobilinogen


 1 mg/dL (0.2 -


1) 


 





 


Urine Leukocyte Esterase


 Large


(NEGATIVE) 


 





 


Urine RBC


 11-20 /HPF


(0-5) 


 





 


Urine WBC


 11-20 /HPF


(0-5) 


 





 


Urine Epithelial Cells


 Moderate /LPF


(NONE) 


 





 


Urine Calcium Oxalate Crystals Few (FEW)   


 


Urine Amorphous Sediment Moderate (FEW)   


 


Urine Bacteria


 Many /HPF


(NONE) 


 





 


Sodium Level


 135 mmol/L


(136-145) 


 





 


Potassium Level


 4.1 mmol/L


(3.5-5.1) 


 





 


Chloride Level


 99 mmol/L


() 


 





 


Carbon Dioxide Level


 25 mmol/L


(22-29) 


 





 


Anion Gap


 15.1 mmol/L


(8-16) 


 





 


Blood Urea Nitrogen


 24 mg/dL


(7-26) 


 





 


Creatinine


 0.57 mg/dL


(0.57-1.11) 


 





 


Estimat Glomerular Filtration


Rate > 60 ML/MIN


(60-) 


 





 


BUN/Creatinine Ratio 42 (6-25)   


 


Glucose Level


 99 mg/dL


() 


 





 


Lactic Acid Level


 1.3 mmol/L


(0.5-2.0) 


 





 


Calcium Level


 9.5 mg/dL


(8.4-10.2) 


 





 


Total Bilirubin


 0.6 mg/dL


(0.2-1.2) 


 





 


Aspartate Amino Transf


(AST/SGOT) 22 IU/L (5-34) 


 


 





 


Alanine Aminotransferase


(ALT/SGPT) 13 IU/L (0-55) 


 


 





 


Alkaline Phosphatase


 105 IU/L


() 


 





 


Creatine Kinase


 27 IU/L


() 


 





 


Creatine Kinase MB


 2.00 ng/mL


(0-5.0) 


 





 


Troponin I


 0.001 ng/mL


(0-0.300) 


 





 


B-Type Natriuretic Peptide


 408.4 pg/mL


(0-100) 


 





 


Total Protein


 8.2 g/dL


(6.5-8.1) 


 





 


Albumin


 1.7 g/dL


(3.5-5.0) 


 





 


Globulin


 6.5 g/dL


(2.3-3.5) 


 





 


Albumin/Globulin Ratio 0.3 (0.8-2.0)   


 


Coronavirus (PCR)


 


 Not detected


(NOTDETECTED) 





 


Influenza Virus Types A,B


Antigen 


 


 Negative


(NEGATIVE)








Laboratory Tests








Test


 11/25/20


19:47 11/25/20


19:43











Imaging


Imaging results reviewed:  Yes


Impressions


Procedure: [id number] DX/CHEST SINGLE (PORTABLE)


Exam Date: 11/25/20                            Exam Time: 2034








                              REPORT STATUS: Signed





EXAMINATION:  CHEST SINGLE (PORTABLE)    





INDICATION:      


^Y


^reported wbc of 25k,


^[id number]


^2034


^Y





COMPARISON:  11/5/2012


     


FINDINGS:  AP view   





TUBES and LINES:  Stable tracheostomy tube.





LUNGS: Limited by rotation. Lungs are well inflated.  Diffuse bilateral


airspace opacities, slightly increased from prior exam.





PLEURA:  No pneumothorax. Suspected trace bilateral pleural effusions.





HEART AND MEDIASTINUM:  The cardiomediastinal silhouette is enlarged,


accentuated by rotation..    





BONES AND SOFT TISSUES:  No acute osseous lesion.  Soft tissues are


unremarkable.





UPPER ABDOMEN: No free air under the diaphragm.    





IMPRESSION: 


Diffuse bilateral airspace opacities, representing edema and/or pneumonia,


slightly worsened compared to prior x-ray.








Signed by: Dr. Tony Davis MD on 11/25/2020 9:17 PM








Dictated By: TONY DAVIS MD


Electronically Signed By: TONY DAVIS MD on 11/25/20 2117


Transcribed By: MILLER on 11/25/20 2117 








COPY TO:   DANE HALL MD~





Procedures


12 Lead ECG Interpretation


ECG Interpretation :  


   ECG:  ECG 1


   :  Interpreted by ED physician


   Date:  Nov 25, 2020


   Time:  19:59


   Rhythm:  atrial fibrillation


   Rate:  normal


   BPM:  83


   QRS axis:  normal


   ST segments normal:  No (NON SPECICIFC CHANGES)


   T waves normal:  No


   T wave inversion:  aVF, V3


   T waves flattening:  II, III, V4, V5, V6


   Other findings:  prolonged QTc interval


   Clinical Impression:  abnormal ECG





Assessment & Plan


Medical Decision Making


[facility]


PT WITH REPORTED ABNORMAL LABS





CBC, CMP, EKG, CXR, CARDIAC ENZYMES, UA, ORDERED TO EVAL FOR ELECTROLYTE 

ABNORMALITY, RENAL FAILURE, UTI, PNEUMONIA, ANEMIA, PULMONARY EDEMA





I SPOKE WITH DR SAUMYA RODRIGUEZ AND DR WHITNEY, PLACE IN ICU DUE TO VENT DEPENDENCE





Assessment & Plan


Final Impression:  


(1) Anemia


(2) UTI (urinary tract infection) due to urinary indwelling catheter


(3) Indwelling catheter present on admission


(4) Pneumonia


Depart Disposition:  ADMITTED


Last Vital Signs











  Date Time  Temp Pulse Resp B/P (MAP) Pulse Ox O2 Delivery O2 Flow Rate FiO2


 


11/25/20 19:38  90 30 135/78 96 Trach Collar  








Home Meds


Reported Medications


Protein Supplement (Protein Powder) 480 Gm Powder, 1 PACKET PEG BID


   10/28/20


Tramadol Hcl (ULTRAM) 50 Mg Tablet, 50 MG PEG Q6H PRN for MODERATE PAIN (4-6), 

TAB


   10/28/20


Polyethylene Glycol 3350 (POLYETHYLENE GLYCOL 3350) 17 Gm Powd.pack, 17 GM PEG 

DAILY, PACKET


   10/28/20


Ondansetron Hcl* (ZOFRAN*) 4 Mg Tablet, 4 MG PEG Q8H PRN for NAUSEA AND VOMITING


   10/28/20


Levothyroxine Sodium (LEVOTHYROXINE SODIUM) 50 Mcg Tablet, 25 MCG PEG DAILY, #30

TAB


   10/28/20


Lansoprazole (PREVACID) 30 Mg Tab.rap.dr, 30 MG PEG DAILY


   10/28/20


Lactulose (LACTULOSE) 20 Gm/30 Ml Solution, 30 ML PEG Q12H PRN for CONSTIPATION,

EACH


   10/28/20


Ipratropium Bromide (IPRATROPIUM BROMIDE) 0.2 Mg/1 Ml Solution, 2.5 ML NEB Q8H, 

EACH


   10/28/20


Insulin Regular, Human (HUMULIN R) 100 Unit/1 Ml Vial, 1 UNIT SQ Q6H


   10/28/20


Insulin Detemir (Levemir Flextouch) 100 Unit/1 Ml Insuln.pen, 10 UNITS SC HS, 

UNIT


   10/28/20


Heparin Sodium,Porcine (HEPARIN SODIUM) 5,000 Unit/1 Ml Vial, 5000 UNITS SQ 

Q12H, ML


   10/28/20


Chlorhexidine Gluconate (CHLORHEXIDINE GLUCONATE) 118 Ml Liquid, 15 ML PO Q12H


   10/28/20


Carvedilol (CARVEDILOL) 12.5 Mg Tablet, 25 MG PEG BID, #60 TAB


   10/28/20


Atorvastatin Calcium (LIPITOR) 20 Mg Tablet, 40 MG PEG HS, TAB


   10/28/20


Amlodipine Besylate (AMLODIPINE BESYLATE) 5 Mg Tablet, 5 MG PEG DAILY, #30 TAB


   10/28/20


Aspirin (ASPIRIN CHEW) 81 Mg Chew, 81 MG PEG DAILY, #30 TAB


   10/28/20


Acetylcysteine (ACETYLCYSTEINE) 200 Mg/1 Ml Vial, 4 ML INH Q12H


   10/28/20


Acetaminophen (ACETAMINOPHEN) 325 Mg Tablet, 650 MG PEG Q6H PRN for Mild Pain 

(1-3) or Fever>100.8, TAB


   10/28/20











DANE HALL MD        Nov 25, 2020 20:23

## 2020-11-26 VITALS — DIASTOLIC BLOOD PRESSURE: 62 MMHG | SYSTOLIC BLOOD PRESSURE: 104 MMHG

## 2020-11-26 VITALS — DIASTOLIC BLOOD PRESSURE: 56 MMHG | SYSTOLIC BLOOD PRESSURE: 93 MMHG

## 2020-11-26 VITALS — SYSTOLIC BLOOD PRESSURE: 105 MMHG | DIASTOLIC BLOOD PRESSURE: 50 MMHG

## 2020-11-26 VITALS — SYSTOLIC BLOOD PRESSURE: 113 MMHG | DIASTOLIC BLOOD PRESSURE: 63 MMHG

## 2020-11-26 VITALS — SYSTOLIC BLOOD PRESSURE: 109 MMHG | DIASTOLIC BLOOD PRESSURE: 63 MMHG

## 2020-11-26 VITALS — SYSTOLIC BLOOD PRESSURE: 125 MMHG | DIASTOLIC BLOOD PRESSURE: 66 MMHG

## 2020-11-26 VITALS — SYSTOLIC BLOOD PRESSURE: 125 MMHG | DIASTOLIC BLOOD PRESSURE: 76 MMHG

## 2020-11-26 VITALS — SYSTOLIC BLOOD PRESSURE: 121 MMHG | DIASTOLIC BLOOD PRESSURE: 71 MMHG

## 2020-11-26 VITALS — DIASTOLIC BLOOD PRESSURE: 57 MMHG | SYSTOLIC BLOOD PRESSURE: 116 MMHG

## 2020-11-26 VITALS — SYSTOLIC BLOOD PRESSURE: 133 MMHG | DIASTOLIC BLOOD PRESSURE: 73 MMHG

## 2020-11-26 VITALS — SYSTOLIC BLOOD PRESSURE: 124 MMHG | DIASTOLIC BLOOD PRESSURE: 59 MMHG

## 2020-11-26 VITALS — SYSTOLIC BLOOD PRESSURE: 107 MMHG | DIASTOLIC BLOOD PRESSURE: 56 MMHG

## 2020-11-26 VITALS — SYSTOLIC BLOOD PRESSURE: 123 MMHG | DIASTOLIC BLOOD PRESSURE: 62 MMHG

## 2020-11-26 VITALS — DIASTOLIC BLOOD PRESSURE: 69 MMHG | SYSTOLIC BLOOD PRESSURE: 121 MMHG

## 2020-11-26 VITALS — DIASTOLIC BLOOD PRESSURE: 61 MMHG | SYSTOLIC BLOOD PRESSURE: 106 MMHG

## 2020-11-26 VITALS — SYSTOLIC BLOOD PRESSURE: 116 MMHG | DIASTOLIC BLOOD PRESSURE: 63 MMHG

## 2020-11-26 VITALS — SYSTOLIC BLOOD PRESSURE: 139 MMHG | DIASTOLIC BLOOD PRESSURE: 68 MMHG

## 2020-11-26 VITALS — DIASTOLIC BLOOD PRESSURE: 70 MMHG | SYSTOLIC BLOOD PRESSURE: 117 MMHG

## 2020-11-26 VITALS — SYSTOLIC BLOOD PRESSURE: 117 MMHG | DIASTOLIC BLOOD PRESSURE: 62 MMHG

## 2020-11-26 VITALS — DIASTOLIC BLOOD PRESSURE: 66 MMHG | SYSTOLIC BLOOD PRESSURE: 132 MMHG

## 2020-11-26 VITALS — DIASTOLIC BLOOD PRESSURE: 63 MMHG | SYSTOLIC BLOOD PRESSURE: 111 MMHG

## 2020-11-26 VITALS — DIASTOLIC BLOOD PRESSURE: 74 MMHG | SYSTOLIC BLOOD PRESSURE: 126 MMHG

## 2020-11-26 VITALS — DIASTOLIC BLOOD PRESSURE: 76 MMHG | SYSTOLIC BLOOD PRESSURE: 113 MMHG

## 2020-11-26 VITALS — DIASTOLIC BLOOD PRESSURE: 58 MMHG | SYSTOLIC BLOOD PRESSURE: 107 MMHG

## 2020-11-26 LAB
ALBUMIN SERPL-MCNC: 1.7 G/DL (ref 3.5–5)
ALBUMIN/GLOB SERPL: 0.3 {RATIO} (ref 0.8–2)
ALP SERPL-CCNC: 104 IU/L (ref 40–150)
ALT SERPL-CCNC: 13 IU/L (ref 0–55)
ANION GAP SERPL CALC-SCNC: 13 MMOL/L (ref 8–16)
BASOPHILS # BLD AUTO: 0 10*3/UL (ref 0–0.1)
BASOPHILS NFR BLD AUTO: 0.2 % (ref 0–1)
BUN SERPL-MCNC: 20 MG/DL (ref 7–26)
BUN/CREAT SERPL: 34 (ref 6–25)
CALCIUM SERPL-MCNC: 9.2 MG/DL (ref 8.4–10.2)
CHLORIDE SERPL-SCNC: 101 MMOL/L (ref 98–107)
CK MB SERPL-MCNC: 1.6 NG/ML (ref 0–5)
CK MB SERPL-MCNC: 2.4 NG/ML (ref 0–5)
CK SERPL-CCNC: 30 IU/L (ref 29–168)
CK SERPL-CCNC: 33 IU/L (ref 29–168)
CO2 BLDCOA CALC-SCNC: 25 MMOL/L
CO2 SERPL-SCNC: 24 MMOL/L (ref 22–29)
DEPRECATED NEUTROPHILS # BLD AUTO: 11.5 10*3/UL (ref 2.1–6.9)
EGFRCR SERPLBLD CKD-EPI 2021: > 60 ML/MIN (ref 60–?)
EOSINOPHIL # BLD AUTO: 0.1 10*3/UL (ref 0–0.4)
EOSINOPHIL NFR BLD AUTO: 0.7 % (ref 0–6)
ERYTHROCYTE [DISTWIDTH] IN CORD BLOOD: 17.1 % (ref 11.7–14.4)
GLOBULIN PLAS-MCNC: 5.9 G/DL (ref 2.3–3.5)
GLUCOSE SERPLBLD-MCNC: 87 MG/DL (ref 74–118)
HCO3 BLDA-SCNC: 24 MMOL/L (ref 22–26)
HCT VFR BLD AUTO: 26.1 % (ref 34.2–44.1)
HGB BLD-MCNC: 8.1 G/DL (ref 12–16)
IRON SATN MFR SERPL: 15 % (ref 15–50)
IRON SERPL-MCNC: 34 UG/DL (ref 50–170)
LYMPHOCYTES # BLD: 0.8 10*3/UL (ref 1–3.2)
LYMPHOCYTES NFR BLD AUTO: 6.1 % (ref 18–39.1)
MCH RBC QN AUTO: 23.8 PG (ref 28–32)
MCHC RBC AUTO-ENTMCNC: 31 G/DL (ref 31–35)
MCV RBC AUTO: 76.5 FL (ref 81–99)
MONOCYTES # BLD AUTO: 0.9 10*3/UL (ref 0.2–0.8)
MONOCYTES NFR BLD AUTO: 6.4 % (ref 4.4–11.3)
NEUTS SEG NFR BLD AUTO: 85.9 % (ref 38.7–80)
PCO2 BLDA: 29 MMHG (ref 35–45)
PCO2 BLDA: 95 MMHG (ref 80–105)
PH BLDA: 7.53 [PH] (ref 7.35–7.45)
PLATELET # BLD AUTO: 232 X10E3/UL (ref 140–360)
POTASSIUM SERPL-SCNC: 4 MMOL/L (ref 3.5–5.1)
RBC # BLD AUTO: 3.41 X10E6/UL (ref 3.6–5.1)
SAO2 % BLDA: 98 % (ref 95–98)
SODIUM SERPL-SCNC: 134 MMOL/L (ref 136–145)
TIBC SERPL-MCNC: 223 UG/DL (ref 261–478)
TRANSFERRIN SERPL-MCNC: 159 MG/DL (ref 180–382)

## 2020-11-26 PROCEDURE — 02HV33Z INSERTION OF INFUSION DEVICE INTO SUPERIOR VENA CAVA, PERCUTANEOUS APPROACH: ICD-10-PCS

## 2020-11-26 RX ADMIN — MEROPENEM SCH MLS/HR: 500 INJECTION INTRAVENOUS at 14:14

## 2020-11-26 RX ADMIN — IPRATROPIUM BROMIDE AND ALBUTEROL SULFATE SCH ML: .5; 2.5 SOLUTION RESPIRATORY (INHALATION) at 19:00

## 2020-11-26 RX ADMIN — CARVEDILOL SCH MG: 12.5 TABLET, FILM COATED ORAL at 16:35

## 2020-11-26 RX ADMIN — VANCOMYCIN HYDROCHLORIDE SCH MLS/HR: 1 INJECTION, SOLUTION INTRAVENOUS at 22:07

## 2020-11-26 RX ADMIN — Medication SCH MG: at 14:00

## 2020-11-26 RX ADMIN — Medication SCH MG: at 20:57

## 2020-11-26 RX ADMIN — HEPARIN SODIUM SCH UNIT: 5000 INJECTION INTRAVENOUS; SUBCUTANEOUS at 14:14

## 2020-11-26 RX ADMIN — SODIUM CHLORIDE SCH MG: 900 INJECTION INTRAVENOUS at 16:35

## 2020-11-26 RX ADMIN — MEROPENEM SCH MLS/HR: 500 INJECTION INTRAVENOUS at 20:57

## 2020-11-26 NOTE — CONSULTATION
DATE OF CONSULTATION:  

 

Pulmonary Consultation. 

 

HISTORY OF PRESENT ILLNESS:  A 75-year-old woman well known to the Pulmonary service

with sad history of an old stroke with hemorrhagic transformation requiring craniotomy,

history of hypertension, diabetes, tracheostomy and PEG and chronic Harris catheter, and

ventilator dependent. 

 

ALLERGIES:  SHE HAS NO KNOWN ALLERGIES.

 

MEDICATIONS:  Include Mucomyst, amlodipine, aspirin, Coreg, Lipitor, Prevacid,

subcutaneous heparin, and insulin. 

 

Unable to relate her history.  She is in a vegetative state, is ventilator dependent,

admitted through the emergency room department, was found to be anemic [facility] and was referred for transfusion.  There is a DNR status. 

 

PHYSICAL EXAMINATION:

VITAL SIGNS:  Temperature 99.4, pulse 88, respirations 23, blood pressure 113/80.

Craniotomy defect. 

LUNGS:  Bilateral rhonchi.  Tracheostomy in place.   

HEART:  Regular rhythm.   

ABDOMEN:  PEG is in place.   

EXTREMITIES:  Nonedematous.  She is in a fetal position with contractures and sacral

wound. 

IMPRESSION:  There is evidence of urinary tract infection, bilateral pneumonia, and

anemia presumably iron deficiency. 

 

PLAN:  Empiric antibiotic therapy.  General supportive care, wound care, ventilator

support, tube feeding, empiric antibiotics for presumed aspiration.  CT scan in the past

has revealed right hemispheric craniectomy with underlying old right middle cerebral

artery territorial infarct.  The right cerebral hemisphere slightly herniates the right

craniotomy defect.  Long-term prognosis is grim.  Persistent vegetative state. 

 

Thank you for this kind referral.

 

 

 

 

______________________________

MD NADIA Olivo/MODL

D:  11/26/2020 13:27:40

T:  11/26/2020 14:30:26

Job #:  [id number]

## 2020-11-26 NOTE — HISTORY AND PHYSICAL
CHIEF COMPLAINT:  Altered mental status at baseline; baseline vegetative state; anemia;

and UTI. 

 

HISTORY OF PRESENT ILLNESS:  This is a 75-year-old -American female, who is

currently residing at [facility] on a mechanical ventilator.  Her history is

complicated.  She had CVA in the past, had a craniotomy, who apparently back in October of 2020 this year came in with cardiac arrest and was in CPR in progress, now presents

with underlying anemia and worsening condition.  The patient at baseline, is at a

vegetative state.  She is chronically on respiratory failure with tracheostomy on the

vent.  She also has a PEG tube placement for feeding.  She also has significant sacral

wound.  The patient was seen and evaluated at bedside on the medical floor in the ICU.

She is unresponsive.  She is at her baseline.  She is at vegetative state.  She is on a

mechanical ventilation.  She has a significant sacral wound.  The patient was found to

be anemic and possibly underlying urinary tract infection noted.  Critical Care has been

consulted including Wound Care and ID.  She is on broad-spectrum IV antibiotic therapy. 

 

REVIEW OF SYSTEMS:

I am not able to obtain review of systems.

 

ALLERGIES:  NO KNOWN DRUG ALLERGIES.

 

MEDICATIONS:  At the nursing home shows:

1. Acetaminophen.

2. Acetylcysteine.

3. Amlodipine.

4. Aspirin.

5. Lipitor.

6. Coreg.

7. Heparin.

8. Lactulose.

9. Levothyroxine.

10. Tramadol.

11. Levemir.

12. Prevacid.

 

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

respiratory failure; tracheostomy on the ventilator; history of atrial fibrillation;

chronic anemia; hypothyroidism; emphysema; hypertension; PEG tube feed; multiple sacral

wound; decubitus wounds; chronically debilitated in a vegetative state at baseline,

muscular contracture. 

 

PAST SURGICAL HISTORY:  She had a craniotomy in the past.  I am not able to obtain any

other information due to the patient's baseline, has a tracheostomy, has a PEG tube

placed. 

 

FAMILY HISTORY:  Unable to obtain.

 

SOCIAL HISTORY:  [facility].

 

PHYSICAL EXAMINATION:

VITAL SIGNS:  Temperature is 99.4, pulse 82, respiratory rate is 18, blood pressure is

113/76, and pulse ox 100% on mechanical ventilation, FiO2 of 40. 

GENERAL:  She has tracheostomy on the vent.  She is not responsive at baseline. 

PULMONARY:  Vent, tracheostomy at baseline. 

CARDIOVASCULAR:  Positive S1, S2.  No murmurs, rubs, or gallops. 

GI:  Abdomen is soft, nondistended, nontender to palpation. 

MUSCULOSKELETAL:  She is in a vegetative state, this is her baseline. 

NEUROLOGIC:  At her baseline. 

SKIN:  She has a sacral wound, stage IV.

LABORATORY DATA:  Show white count was 13; hemoglobin 8.1, on admission her hemoglobin

was 6.6; hematocrit is 26; and platelets of 232.  Chemistry, sodium 134, potassium 4,

chloride 101, bicarb 24, anion gap of 13, BUN is 20, creatinine 0.58, and glucose is 87.

 Lactic acid is 1.3.  LFTs noted.  Calcium 9.2, total bilirubin was 1, AST 23, ALT 13,

alkaline phosphatase 104, , total protein 7.6, and albumin 1.7.  Troponins were

all negative.  Urinalysis noted to be UTI.  Serology, coronavirus not detected.  Flu was

not detected. 

 

MICROBIOLOGY:  Blood culture is pending.  Urine culture shows gram-negative bacillus.

 

IMAGING STUDIES:  Chest x-ray shows diffuse bilateral airspace opacities representing

edema and/or pneumonia. 

 

IMPRESSION:  

1. Metabolic encephalopathy at baseline due to prior history of cerebrovascular accident

and craniotomy in a vegetative state. 

2. Prior history of cardiac arrest in October 2020.

3. Anemia of chronic disease.

4. Sepsis with leukocytosis secondary to urinary tract infection.

5. Stage IV sacral wound.

6. Medically debilitated, very poor prognosis, currently DNR, likely needs hospice.

 

PLAN:  At this time, the patient has pancultures, blood and urine cultures, in which

currently the urine cultures becoming positive.  Continue with IV antibiotic therapy.

Consult with ID for further management and care.  Pulmonary Critical Care was consulted

for critical care management as well as the vent settings.  A 2 units packed RBCs have

been given with much improved hemoglobin.  Get morning labs.  Get wound care

consultation.  Local wound care provided.  Put on IV Protonix for prophylaxis.  Hold

anticoagulation due to underlying anemia.  Maintain on broad-spectrum antibiotics.

Consultants involved ID, Wound Care, Pulmonary Critical Care.  I spoke with her sister,

her name is Carmencita, by phone, who apparently makes medical decisions on behalf of her

sister.  Apparently, Carmencita has made decisions for her sister on several occasions.

Currently, the patient does not have any children and has just a spouse, but the spouse

is really old and he is severely demented according to Carmencita and the only

decision-making person is Carmencita, who is the sister of the patient, Ms. Singer.  At this

time, the patient has been at this hospital many times and she has been in a vegetative

state for significant number of months if not years and this is currently her baseline.

At this time, she knows that her sister is very ill and sick and wants her sister to be

DNR, which was placed in the ER already.  [facility]  The patient truly benefit from hospice care as

she is severely debilitated in a vegetative state.  Her prognosis is significantly poor.

 I will speak to Ms. Tse about this, but she was well aware of this on prior

admissions, and she has refused about placing her sister on hospice.  At this time, she

has agreed to DNR/DNI, which was ordered in the ER and I discussed the plan of care,

which she verbalized understanding. 

 

 

 

 

______________________________

MD GASTON Akbar/RUDDY

D:  11/26/2020 12:54:17

T:  11/26/2020 13:33:07

Job #:  [id number]

## 2020-11-27 VITALS — DIASTOLIC BLOOD PRESSURE: 62 MMHG | SYSTOLIC BLOOD PRESSURE: 105 MMHG

## 2020-11-27 VITALS — SYSTOLIC BLOOD PRESSURE: 124 MMHG | DIASTOLIC BLOOD PRESSURE: 63 MMHG

## 2020-11-27 VITALS — DIASTOLIC BLOOD PRESSURE: 59 MMHG | SYSTOLIC BLOOD PRESSURE: 109 MMHG

## 2020-11-27 VITALS — DIASTOLIC BLOOD PRESSURE: 57 MMHG | SYSTOLIC BLOOD PRESSURE: 113 MMHG

## 2020-11-27 VITALS — SYSTOLIC BLOOD PRESSURE: 113 MMHG | DIASTOLIC BLOOD PRESSURE: 61 MMHG

## 2020-11-27 VITALS — SYSTOLIC BLOOD PRESSURE: 116 MMHG | DIASTOLIC BLOOD PRESSURE: 60 MMHG

## 2020-11-27 VITALS — DIASTOLIC BLOOD PRESSURE: 66 MMHG | SYSTOLIC BLOOD PRESSURE: 130 MMHG

## 2020-11-27 VITALS — SYSTOLIC BLOOD PRESSURE: 115 MMHG | DIASTOLIC BLOOD PRESSURE: 60 MMHG

## 2020-11-27 VITALS — SYSTOLIC BLOOD PRESSURE: 112 MMHG | DIASTOLIC BLOOD PRESSURE: 57 MMHG

## 2020-11-27 VITALS — SYSTOLIC BLOOD PRESSURE: 119 MMHG | DIASTOLIC BLOOD PRESSURE: 60 MMHG

## 2020-11-27 VITALS — DIASTOLIC BLOOD PRESSURE: 66 MMHG | SYSTOLIC BLOOD PRESSURE: 117 MMHG

## 2020-11-27 VITALS — SYSTOLIC BLOOD PRESSURE: 112 MMHG | DIASTOLIC BLOOD PRESSURE: 63 MMHG

## 2020-11-27 VITALS — DIASTOLIC BLOOD PRESSURE: 67 MMHG | SYSTOLIC BLOOD PRESSURE: 117 MMHG

## 2020-11-27 VITALS — DIASTOLIC BLOOD PRESSURE: 58 MMHG | SYSTOLIC BLOOD PRESSURE: 108 MMHG

## 2020-11-27 VITALS — SYSTOLIC BLOOD PRESSURE: 118 MMHG | DIASTOLIC BLOOD PRESSURE: 65 MMHG

## 2020-11-27 VITALS — SYSTOLIC BLOOD PRESSURE: 128 MMHG | DIASTOLIC BLOOD PRESSURE: 70 MMHG

## 2020-11-27 VITALS — DIASTOLIC BLOOD PRESSURE: 72 MMHG | SYSTOLIC BLOOD PRESSURE: 118 MMHG

## 2020-11-27 VITALS — DIASTOLIC BLOOD PRESSURE: 65 MMHG | SYSTOLIC BLOOD PRESSURE: 120 MMHG

## 2020-11-27 VITALS — DIASTOLIC BLOOD PRESSURE: 58 MMHG | SYSTOLIC BLOOD PRESSURE: 107 MMHG

## 2020-11-27 VITALS — DIASTOLIC BLOOD PRESSURE: 63 MMHG | SYSTOLIC BLOOD PRESSURE: 104 MMHG

## 2020-11-27 VITALS — DIASTOLIC BLOOD PRESSURE: 60 MMHG | SYSTOLIC BLOOD PRESSURE: 119 MMHG

## 2020-11-27 VITALS — DIASTOLIC BLOOD PRESSURE: 58 MMHG | SYSTOLIC BLOOD PRESSURE: 114 MMHG

## 2020-11-27 VITALS — DIASTOLIC BLOOD PRESSURE: 69 MMHG | SYSTOLIC BLOOD PRESSURE: 127 MMHG

## 2020-11-27 LAB
ANION GAP SERPL CALC-SCNC: 13.7 MMOL/L (ref 8–16)
BASOPHILS # BLD AUTO: 0 10*3/UL (ref 0–0.1)
BASOPHILS NFR BLD AUTO: 0.3 % (ref 0–1)
BUN SERPL-MCNC: 20 MG/DL (ref 7–26)
BUN/CREAT SERPL: 30 (ref 6–25)
CALCIUM SERPL-MCNC: 9 MG/DL (ref 8.4–10.2)
CHLORIDE SERPL-SCNC: 102 MMOL/L (ref 98–107)
CO2 SERPL-SCNC: 24 MMOL/L (ref 22–29)
DEPRECATED NEUTROPHILS # BLD AUTO: 10.3 10*3/UL (ref 2.1–6.9)
EGFRCR SERPLBLD CKD-EPI 2021: > 60 ML/MIN (ref 60–?)
EOSINOPHIL # BLD AUTO: 0.1 10*3/UL (ref 0–0.4)
EOSINOPHIL NFR BLD AUTO: 1 % (ref 0–6)
ERYTHROCYTE [DISTWIDTH] IN CORD BLOOD: 17.2 % (ref 11.7–14.4)
GLUCOSE SERPLBLD-MCNC: 119 MG/DL (ref 74–118)
HCT VFR BLD AUTO: 26.4 % (ref 34.2–44.1)
HGB BLD-MCNC: 8.2 G/DL (ref 12–16)
LYMPHOCYTES # BLD: 1 10*3/UL (ref 1–3.2)
LYMPHOCYTES NFR BLD AUTO: 7.9 % (ref 18–39.1)
MCH RBC QN AUTO: 23.8 PG (ref 28–32)
MCHC RBC AUTO-ENTMCNC: 31.1 G/DL (ref 31–35)
MCV RBC AUTO: 76.5 FL (ref 81–99)
MONOCYTES # BLD AUTO: 1 10*3/UL (ref 0.2–0.8)
MONOCYTES NFR BLD AUTO: 7.7 % (ref 4.4–11.3)
NEUTS SEG NFR BLD AUTO: 82.2 % (ref 38.7–80)
PLATELET # BLD AUTO: 239 X10E3/UL (ref 140–360)
POTASSIUM SERPL-SCNC: 3.7 MMOL/L (ref 3.5–5.1)
RBC # BLD AUTO: 3.45 X10E6/UL (ref 3.6–5.1)
SODIUM SERPL-SCNC: 136 MMOL/L (ref 136–145)

## 2020-11-27 RX ADMIN — IPRATROPIUM BROMIDE AND ALBUTEROL SULFATE SCH ML: .5; 2.5 SOLUTION RESPIRATORY (INHALATION) at 19:00

## 2020-11-27 RX ADMIN — HEPARIN SODIUM SCH UNIT: 5000 INJECTION INTRAVENOUS; SUBCUTANEOUS at 16:35

## 2020-11-27 RX ADMIN — Medication SCH MG: at 07:32

## 2020-11-27 RX ADMIN — ASPIRIN 81 MG CHEWABLE TABLET SCH MG: 81 TABLET CHEWABLE at 08:16

## 2020-11-27 RX ADMIN — Medication SCH MG: at 02:00

## 2020-11-27 RX ADMIN — Medication SCH MG: at 20:24

## 2020-11-27 RX ADMIN — Medication SCH MG: at 14:00

## 2020-11-27 RX ADMIN — Medication SCH MG: at 20:40

## 2020-11-27 RX ADMIN — HEPARIN SODIUM SCH UNIT: 5000 INJECTION INTRAVENOUS; SUBCUTANEOUS at 01:26

## 2020-11-27 RX ADMIN — SODIUM CHLORIDE SCH MG: 900 INJECTION INTRAVENOUS at 08:16

## 2020-11-27 RX ADMIN — IPRATROPIUM BROMIDE AND ALBUTEROL SULFATE SCH ML: .5; 2.5 SOLUTION RESPIRATORY (INHALATION) at 01:00

## 2020-11-27 RX ADMIN — LEVOTHYROXINE SODIUM SCH MCG: 50 TABLET ORAL at 08:17

## 2020-11-27 RX ADMIN — IPRATROPIUM BROMIDE AND ALBUTEROL SULFATE SCH ML: .5; 2.5 SOLUTION RESPIRATORY (INHALATION) at 14:50

## 2020-11-27 RX ADMIN — SODIUM CHLORIDE SCH MG: 900 INJECTION INTRAVENOUS at 16:37

## 2020-11-27 RX ADMIN — POLYETHYLENE GLYCOL 3350 SCH GM: 17 POWDER, FOR SOLUTION ORAL at 08:17

## 2020-11-27 RX ADMIN — MEROPENEM SCH MLS/HR: 500 INJECTION INTRAVENOUS at 08:16

## 2020-11-27 RX ADMIN — CARVEDILOL SCH MG: 12.5 TABLET, FILM COATED ORAL at 16:37

## 2020-11-27 RX ADMIN — MEROPENEM SCH MLS/HR: 500 INJECTION INTRAVENOUS at 20:24

## 2020-11-27 RX ADMIN — VANCOMYCIN HYDROCHLORIDE SCH MLS/HR: 1 INJECTION, SOLUTION INTRAVENOUS at 22:21

## 2020-11-27 RX ADMIN — MEROPENEM SCH MLS/HR: 500 INJECTION INTRAVENOUS at 01:23

## 2020-11-27 RX ADMIN — CARVEDILOL SCH MG: 12.5 TABLET, FILM COATED ORAL at 08:17

## 2020-11-27 RX ADMIN — MEROPENEM SCH MLS/HR: 500 INJECTION INTRAVENOUS at 16:36

## 2020-11-27 RX ADMIN — IPRATROPIUM BROMIDE AND ALBUTEROL SULFATE SCH ML: .5; 2.5 SOLUTION RESPIRATORY (INHALATION) at 20:40

## 2020-11-27 RX ADMIN — IPRATROPIUM BROMIDE AND ALBUTEROL SULFATE SCH ML: .5; 2.5 SOLUTION RESPIRATORY (INHALATION) at 07:32

## 2020-11-27 NOTE — NUR
WOUND CARE CONSULT 74 YO FEMALE HX OF ANEMIA



DOROTEO 6 0N  STRICT  PUP STATUS AND INTERVENTIONS ON ALTERNATING PRESSURE  SURFACE



LABS: WBC- 12.49

HGB- 8.2

GLUCOSE-PENDING 

 POSITIVE WOUND AND BLOOD CULTURES WITH ANTIBIOTIC COVERAGE 



 HEAD TO TOE SKIN ASSESSMENT COMPLETE PATIENT PRESENTS WITH  SACRAL STAGE 4 ULCERATION 17CM 
X10CM X 3CM UNDERMINING FULL CIRCUMFERENCE 4CM   

RECOMMENDATIONS: 

 NURSING TO CONTINUE TO MONITOR PATIENT AND KEEP SKIN CLEAN AND FREE FROM LOOSE STOOL OR 
IRRITATING MOISTURE AND CONTINUE TO FOLLOW  STRICT  PUP STATUS INTERVENTIONS

NURSING TO CONTINUE TO MAINTAIN  PATIENT NUTRITIONAL SUPPORT FOR OPTIMUM HEALING 

NURSING TO CLEAN SACRAL STAGE 4 ULCERATION  WITH DAKINS DAILY AND APPLY SILVERSORB GELL AND 
GUASE PACKING TO WOUND BED AND UNDERMINING  AND COVER WITH ALLEVYN FOAM DRESSING   

 

  


-------------------------------------------------------------------------------

Addendum: 11/27/20 at 1115 by Clarence Williamson RN

-------------------------------------------------------------------------------

Amended: Links added.

## 2020-11-27 NOTE — CONSULTATION
DATE OF CONSULTATION:  

 

Wound consultation 

 

HISTORY OF PRESENT ILLNESS:  A 75-year-old black female patient, history of stroke,

chronically bed-bound, had craniotomy in October 2020, and also had a cardiac arrest

status post CPR, currently on mechanical ventilation through tracheostomy, dysphagia on

PEG feeding, has chronic nonhealing sacral pressure ulcer, stage IV wound, the patient

is contracted, vegetative state. 

 

PAST MEDICAL HISTORY:  Stroke, hypothyroidism, hyperlipidemia, hypertension, left middle

cerebral artery stenosis, chronic respiratory failure, and emphysema. 

 

PAST SURGICAL HISTORY:  Status post craniotomy, tracheostomy, and PEG tube placement.

 

PERSONAL HISTORY:  No history of smoking, lives at [facility].

 

MEDICATIONS:  Amlodipine, Lipitor, Coreg, levothyroxine, tramadol, and Levemir.

 

PHYSICAL EXAMINATION:

VITAL SIGNS:  Height 63 inches, weight 131 pounds. 

HEENT:  Normal.  Remains eyes closed. 

NECK:  Tracheostomy in place. 

LUNGS:  Diminished air entry at the bases. 

CVS:  Normal. 

ABDOMEN:  Soft.  PEG tube is in place. 

EXTREMITIES:  Lower extremities contracted. 

SKIN:  Sacral area, the patient has mid sacral stage IV pressure ulcer, measures 5 x 6

cm with undermining between 12 and 12 o'clock position for 3 cm and periwound area,

there is stage III ulcer, 10 x 10 cm, bloody drainage noted, bone palpable, not exposed. 

ASSESSMENT:  Sacral pressure ulcer stage IV, nonhealing, multiple comorbid unavoidable

risk factors that will affect wound healing.  This is a non-healable wound, so we will

continue packing the wound with a Dakin's moistened 4 x 4, ABD, and tape. 

 

Thank you for consultation.  We will follow up.

 

 

 

 

______________________________

MD JOSE Howell/MODL

D:  11/27/2020 10:15:07

T:  11/27/2020 11:16:17

Job #:  [id number]

## 2020-11-27 NOTE — CONSULTATION
DATE OF CONSULTATION:    

 

The patient is seen and evaluated, available labs and notes reviewed.

 

HISTORY OF PRESENT ILLNESS:  This is a 75-year-old  female, who was

transferred from skilled nursing facility due to anemia with hemoglobin of 6.6 and

leukocytosis of 25,000 per my discussion and verbal report from nursing staff.  The

patient was afebrile on the day of transfer.  Overall, the patient nonresponsive in a

vegetative state and no other symptoms were reported. 

 

PAST MEDICAL HISTORY:  CVA, chronic respiratory failure, dysphagia, debility/bedbound,

vegetative state, hypothyroidism, emphysema, hypertension, multiple wounds, contracted

extremities. 

 

PAST SURGICAL HISTORY:  Craniotomy, tracheostomy, and feeding tube placement.

 

SOCIAL HISTORY:  The patient is bedbound, nursing home/SNF resident with no recent

history of tobacco, alcohol or illicit drugs.  The patient is in a vegetative state. 

 

ALLERGIES:  NO KNOWN ALLERGIES.

 

MEDICATION LIST:  The patient is on vancomycin IV and meropenem from ID point of view.

 

LABORATORY STUDIES:  White count of 12.49, hemoglobin 8.2, platelet 239.  Sodium 136,

potassium 3.7, creatinine 0.66, AST 23, ALT 13, .  COVID-19 PCR not detected on

11/25/2020.  Influenza A and B negative. 

 

RADIOLOGY STUDIES:  Chest x-ray showed diffuse bilateral airspace opacities representing

edema and/or pneumonia, slightly worsened compared to prior x-ray. 

 

MICROBIOLOGY:  Blood culture negative.  Urine showed gram-negative bacilli.

 

REVIEW OF SYSTEMS:

Unable to obtain review of systems due to the patient's medical condition.

 

PHYSICAL EXAMINATION:

GENERAL:  Comfortable in bed, no acute distress, trach and vent.  Feeding tube.   

CV:  S1-S2. 

CHEST:  Decreased breath sounds.  Equal expansion.  No acute distress. 

ABDOMEN:  Soft.  Positive bowel sounds.   

EXTREMITIES:  Multiple wounds and contracted.

ASSESSMENT:  

1. Leukocytosis, most likely due to urinary tract infection and pneumonia.

2. Urinary tract infection.

3. Pneumonia.

4. Anemia.

5. Multiple wounds.

6. Respiratory failure, chronic.

7. Dysphagia.

8. Aspiration risk.

9. Gastritis/gastroesophageal reflux disease.

10. Hyperlipidemia.

 

PLAN:  Continue with antibiotics at this point.  Follow up with the cultures.  Follow up

with the labs.  Further management of this patient is based on laboratory and physical

examination as more information comes in.  Discussed with Dr. Naik in details.  We

will get vancomycin trough.  Please refer to chart for more information. 

 

Thank you for this dictation.

 

 

 

 

______________________________

MD JOEL Purcell/RUDDY

D:  11/27/2020 07:21:51

T:  11/27/2020 09:11:49

Job #:  [id number]

## 2020-11-28 VITALS — SYSTOLIC BLOOD PRESSURE: 104 MMHG | DIASTOLIC BLOOD PRESSURE: 58 MMHG

## 2020-11-28 VITALS — DIASTOLIC BLOOD PRESSURE: 54 MMHG | SYSTOLIC BLOOD PRESSURE: 96 MMHG

## 2020-11-28 VITALS — SYSTOLIC BLOOD PRESSURE: 97 MMHG | DIASTOLIC BLOOD PRESSURE: 57 MMHG

## 2020-11-28 VITALS — SYSTOLIC BLOOD PRESSURE: 137 MMHG | DIASTOLIC BLOOD PRESSURE: 79 MMHG

## 2020-11-28 VITALS — DIASTOLIC BLOOD PRESSURE: 54 MMHG | SYSTOLIC BLOOD PRESSURE: 101 MMHG

## 2020-11-28 VITALS — DIASTOLIC BLOOD PRESSURE: 72 MMHG | SYSTOLIC BLOOD PRESSURE: 133 MMHG

## 2020-11-28 VITALS — SYSTOLIC BLOOD PRESSURE: 118 MMHG | DIASTOLIC BLOOD PRESSURE: 70 MMHG

## 2020-11-28 VITALS — DIASTOLIC BLOOD PRESSURE: 60 MMHG | SYSTOLIC BLOOD PRESSURE: 115 MMHG

## 2020-11-28 VITALS — DIASTOLIC BLOOD PRESSURE: 64 MMHG | SYSTOLIC BLOOD PRESSURE: 115 MMHG

## 2020-11-28 VITALS — SYSTOLIC BLOOD PRESSURE: 106 MMHG | DIASTOLIC BLOOD PRESSURE: 59 MMHG

## 2020-11-28 VITALS — DIASTOLIC BLOOD PRESSURE: 68 MMHG | SYSTOLIC BLOOD PRESSURE: 115 MMHG

## 2020-11-28 VITALS — SYSTOLIC BLOOD PRESSURE: 117 MMHG | DIASTOLIC BLOOD PRESSURE: 60 MMHG

## 2020-11-28 VITALS — DIASTOLIC BLOOD PRESSURE: 65 MMHG | SYSTOLIC BLOOD PRESSURE: 114 MMHG

## 2020-11-28 VITALS — DIASTOLIC BLOOD PRESSURE: 58 MMHG | SYSTOLIC BLOOD PRESSURE: 101 MMHG

## 2020-11-28 VITALS — SYSTOLIC BLOOD PRESSURE: 115 MMHG | DIASTOLIC BLOOD PRESSURE: 64 MMHG

## 2020-11-28 VITALS — SYSTOLIC BLOOD PRESSURE: 127 MMHG | DIASTOLIC BLOOD PRESSURE: 72 MMHG

## 2020-11-28 VITALS — DIASTOLIC BLOOD PRESSURE: 84 MMHG | SYSTOLIC BLOOD PRESSURE: 131 MMHG

## 2020-11-28 VITALS — DIASTOLIC BLOOD PRESSURE: 57 MMHG | SYSTOLIC BLOOD PRESSURE: 108 MMHG

## 2020-11-28 VITALS — DIASTOLIC BLOOD PRESSURE: 62 MMHG | SYSTOLIC BLOOD PRESSURE: 101 MMHG

## 2020-11-28 VITALS — SYSTOLIC BLOOD PRESSURE: 113 MMHG | DIASTOLIC BLOOD PRESSURE: 58 MMHG

## 2020-11-28 VITALS — DIASTOLIC BLOOD PRESSURE: 63 MMHG | SYSTOLIC BLOOD PRESSURE: 122 MMHG

## 2020-11-28 VITALS — DIASTOLIC BLOOD PRESSURE: 55 MMHG | SYSTOLIC BLOOD PRESSURE: 111 MMHG

## 2020-11-28 VITALS — SYSTOLIC BLOOD PRESSURE: 118 MMHG | DIASTOLIC BLOOD PRESSURE: 80 MMHG

## 2020-11-28 LAB
ANION GAP SERPL CALC-SCNC: 12.5 MMOL/L (ref 8–16)
BASOPHILS # BLD AUTO: 0 10*3/UL (ref 0–0.1)
BASOPHILS NFR BLD AUTO: 0.2 % (ref 0–1)
BUN SERPL-MCNC: 16 MG/DL (ref 7–26)
BUN/CREAT SERPL: 27 (ref 6–25)
CALCIUM SERPL-MCNC: 8.8 MG/DL (ref 8.4–10.2)
CHLORIDE SERPL-SCNC: 104 MMOL/L (ref 98–107)
CO2 SERPL-SCNC: 25 MMOL/L (ref 22–29)
DEPRECATED NEUTROPHILS # BLD AUTO: 9.8 10*3/UL (ref 2.1–6.9)
EGFRCR SERPLBLD CKD-EPI 2021: > 60 ML/MIN (ref 60–?)
EOSINOPHIL # BLD AUTO: 0.3 10*3/UL (ref 0–0.4)
EOSINOPHIL NFR BLD AUTO: 2.6 % (ref 0–6)
ERYTHROCYTE [DISTWIDTH] IN CORD BLOOD: 17.3 % (ref 11.7–14.4)
GLUCOSE SERPLBLD-MCNC: 141 MG/DL (ref 74–118)
HCT VFR BLD AUTO: 26.3 % (ref 34.2–44.1)
HGB BLD-MCNC: 8.2 G/DL (ref 12–16)
LYMPHOCYTES # BLD: 1.1 10*3/UL (ref 1–3.2)
LYMPHOCYTES NFR BLD AUTO: 9.1 % (ref 18–39.1)
MCH RBC QN AUTO: 24 PG (ref 28–32)
MCHC RBC AUTO-ENTMCNC: 31.2 G/DL (ref 31–35)
MCV RBC AUTO: 77.1 FL (ref 81–99)
MONOCYTES # BLD AUTO: 0.8 10*3/UL (ref 0.2–0.8)
MONOCYTES NFR BLD AUTO: 6.7 % (ref 4.4–11.3)
NEUTS SEG NFR BLD AUTO: 80 % (ref 38.7–80)
PLATELET # BLD AUTO: 238 X10E3/UL (ref 140–360)
POTASSIUM SERPL-SCNC: 3.5 MMOL/L (ref 3.5–5.1)
RBC # BLD AUTO: 3.41 X10E6/UL (ref 3.6–5.1)
SODIUM SERPL-SCNC: 138 MMOL/L (ref 136–145)

## 2020-11-28 RX ADMIN — LEVOTHYROXINE SODIUM SCH MCG: 50 TABLET ORAL at 08:16

## 2020-11-28 RX ADMIN — ASPIRIN 81 MG CHEWABLE TABLET SCH MG: 81 TABLET CHEWABLE at 08:15

## 2020-11-28 RX ADMIN — MEROPENEM SCH MLS/HR: 500 INJECTION INTRAVENOUS at 14:41

## 2020-11-28 RX ADMIN — IPRATROPIUM BROMIDE AND ALBUTEROL SULFATE SCH ML: .5; 2.5 SOLUTION RESPIRATORY (INHALATION) at 14:22

## 2020-11-28 RX ADMIN — SODIUM CHLORIDE SCH MG: 900 INJECTION INTRAVENOUS at 08:15

## 2020-11-28 RX ADMIN — IPRATROPIUM BROMIDE AND ALBUTEROL SULFATE SCH ML: .5; 2.5 SOLUTION RESPIRATORY (INHALATION) at 19:45

## 2020-11-28 RX ADMIN — HEPARIN SODIUM SCH UNIT: 5000 INJECTION INTRAVENOUS; SUBCUTANEOUS at 02:10

## 2020-11-28 RX ADMIN — CARVEDILOL SCH MG: 12.5 TABLET, FILM COATED ORAL at 08:16

## 2020-11-28 RX ADMIN — POLYETHYLENE GLYCOL 3350 SCH GM: 17 POWDER, FOR SOLUTION ORAL at 08:16

## 2020-11-28 RX ADMIN — IPRATROPIUM BROMIDE AND ALBUTEROL SULFATE SCH ML: .5; 2.5 SOLUTION RESPIRATORY (INHALATION) at 19:00

## 2020-11-28 RX ADMIN — HEPARIN SODIUM SCH UNIT: 5000 INJECTION INTRAVENOUS; SUBCUTANEOUS at 13:48

## 2020-11-28 RX ADMIN — IPRATROPIUM BROMIDE AND ALBUTEROL SULFATE SCH ML: .5; 2.5 SOLUTION RESPIRATORY (INHALATION) at 07:34

## 2020-11-28 RX ADMIN — SODIUM CHLORIDE SCH MG: 900 INJECTION INTRAVENOUS at 17:20

## 2020-11-28 RX ADMIN — SODIUM HYPOCHLORITE SCH ML: 2.5 SOLUTION TOPICAL at 08:15

## 2020-11-28 RX ADMIN — Medication SCH MG: at 20:45

## 2020-11-28 RX ADMIN — Medication SCH MG: at 14:00

## 2020-11-28 RX ADMIN — MEROPENEM SCH MLS/HR: 500 INJECTION INTRAVENOUS at 02:02

## 2020-11-28 RX ADMIN — MEROPENEM SCH MLS/HR: 500 INJECTION INTRAVENOUS at 08:15

## 2020-11-28 RX ADMIN — CARVEDILOL SCH MG: 12.5 TABLET, FILM COATED ORAL at 17:20

## 2020-11-28 RX ADMIN — Medication SCH MG: at 07:34

## 2020-11-28 RX ADMIN — MEROPENEM SCH MLS/HR: 500 INJECTION INTRAVENOUS at 20:45

## 2020-11-28 RX ADMIN — IPRATROPIUM BROMIDE AND ALBUTEROL SULFATE SCH ML: .5; 2.5 SOLUTION RESPIRATORY (INHALATION) at 03:55

## 2020-11-28 RX ADMIN — VANCOMYCIN HYDROCHLORIDE SCH MLS/HR: 1 INJECTION, SOLUTION INTRAVENOUS at 22:02

## 2020-11-28 NOTE — NUR
Nutrition Intervention Note



RD Recommendation(s) for Physician: Change formula to Vital AF 1.2 at 60ml/hr via PEG (1728 
kcals, 108g of protein 1440ml of fluid and 1167ml of free water)



Plan of Care: RD following, monitoring for tolerance and adequacy 



Nutrition reason for involvement:

[facility]



RD Assessment

Initial encounter with patient. Pt was not able to provide a nutrition Hx due to altered 
mental status. Pt is currently receiving Vital High Protein at 30ml/hr which provides 720 
kcals, 63g of protein, and 601.92ml of free H2O. Current nutrition regimen is not adequate 
to meet estimated nutrition needs.  Recommend to change formula to Vital AF 1.2 at 60ml/hr 
via PEG 



Principal Problems/Diagnoses: Suboptimal infusion of EN related to goal rate not reached as 
evidenced by the total volume of EN infused per day provides less than estimated energy and 
protein needs. 



PMH: CVA, craniotomy, stage 4 sacral wound, dysphagia, Anemia, respiratory failure, 
tracheostomy, vent dependent, PEG placement.



GI: soft nondistended,on bowel regimen, 



Skin: Stage 4 sacral 



Labs: 11/28/2020) biochemical data reviewed 



Meds: (11/28//2020) MAR reviewed 



Ht:63 in.

Wt:131.44 lbs

BMI:23.3kg/M2

IBW:115lbs



Malnutrition Evaluation (11/28/20)

The patient does not meet criteria for a specified degree of malnutrition at this time. Will 
re-evaluate at follow-up as appropriate. 











Nutrition Prescription (Diet Order):NPO. Vital High Protein at 30ml/hr via PEG



Estimated Nutritional Needs:

1493-1792calories/day ( 25-30kcals/kg/BW)

72-89g protein/day (1.2-1.5 g pro/kg/BW)



Diet Adequacy:

 Not meeting calorie needs, Not meeting protein needs





Diet Education Needs Assessment:



Diet education not indicated.



Nutrition Care Level:  Moderate



Nutrition Diagnosis: Suboptimal infusion of En related to goal rate not reached as evidenced 
by the toal volume of EN infused per day provides less than estimated energy and protein 
needs.  





Goal: Patient will meet % of estimated needs by follow up 

Progress: (Progressing)



Interventions:

 Commercial food, Composition, Rate, Route, IVF, Prescription medications



Monitoring/Evaluation:

Total energy intake, Total protein intake, Formula/Solution, IVF, Prescription medication, 
Weight change.





Signed: Laurent Guy RD, [facility], [facility]

## 2020-11-28 NOTE — PROGRESS NOTE
DATE:  11/27/2020

 

Medicine Progress Note.

 

This is a late entry note.

 

SUBJECTIVE:  The patient is still at her baseline.  She is in a vegetative state.  This

is her normal. She is in a vent. No overnight events. 

 

OBJECTIVE:  VITAL SIGNS: She is afebrile, normotensive.  Respiratory rate is good. 

GENERAL: She is vegetative state.  This is her baseline, mechanical ventilation.

Pulmonary and mechanical vent chronically. 

NEUROLOGICAL:  Mechanical vent.  She is in a vegetative state, does not respond.  This

is her baseline. 

MUSCULOSKELETAL:  Unable to assess. 

CARDIOVASCULAR:  Positive S1, S2.  No murmurs, rubs, or gallops. 

ABDOMEN:  Soft, nondistended, nontender to palpation.

 

LAB FINDINGS:  White count was 12, hemoglobin 8.2, hematocrit 26, and platelets were

239.  Her chemistry reviewed sodium 136, potassium 3.7, chloride 102 bicarb 24, anion

gap of 13, BUN 20, creatinine 0.66, and glucose is 119. 

 

MICROBIOLOGY:  Urine cultures two species of gram-negative bacillus in the urine.  Blood

cultures, no growth. 

 

IMAGING STUDIES:  Nothing new.

 

IMPRESSION:  

1. Metabolic encephalopathy at baseline due to prior history of cerebrovascular accident

in the past and craniotomy in a vegetative state at baseline. 

2. History of cardiac arrest back in October 2020.

3. Anemia of chronic disease.

4. Sepsis with leukocytosis secondary to urinary tract infection.

5. Stage IV sacral wound.

6. Medically debilitated, very poor prognosis.  DNR needs hospice.

 

PLAN:  At this time her pan cultures show blood cultures with no growth to date.  Urine

culture shows two species of gram-negative bacillus in which, she is on IV antibiotic

therapy.  ID has been consulted.  Monitor accordingly.  She is on IV antibiotic therapy.

 She did receive blood hemoglobin is better.  Continue with IV Protonix for prophylaxis.

 We will hold anticoagulation due to underlying anemia.  Pulmonary Critical Care has

been consulted and managing her vent settings as well.  We will get morning labs.  I

also will have Wound Care consulted to evaluate her sacral wound.  We will get local

wound care.  Once again, the family wants to continue with 

medical management.  She is DNR.  Plan to discharge hopefully soon sometime early next

week if stable [facility]. 

 

 

 

______________________________

MD GASTON Akbar/RUDDY WAITE:  11/28/2020 07:58:04

T:  11/28/2020 08:29:21

Job #:  [id number]

## 2020-11-28 NOTE — NUR
INFECTIOUS DISEASE PROGRESS NOTE

DR. MINI CAMARILLO   

 

The patient is seen and evaluated, available labs and notes reviewed.

 

HISTORY OF PRESENT ILLNESS:  This is a 75-year-old  female, who 
was

transferred from skilled nursing facility due to anemia with hemoglobin of 6.6 
and

leukocytosis of 25,000 per my discussion and verbal report from nursing staff.  
The

patient was afebrile on the day of transfer.  Overall, the patient 
nonresponsive in a

vegetative state and no other symptoms were reported. 

 

PAST MEDICAL HISTORY:  CVA, chronic respiratory failure, dysphagia, 
debility/bedbound,

vegetative state, hypothyroidism, emphysema, hypertension, multiple wounds, 
contracted

extremities. 

 

ALLERGIES:  NO KNOWN ALLERGIES.

 

MEDICATION LIST:  The patient is on vancomycin IV and meropenem from ID point 
of view.

 

LABORATORY STUDIES:  per chart 



RADIOLOGY STUDIES:  Chest x-ray showed diffuse bilateral airspace opacities 
representing

edema and/or pneumonia, slightly worsened compared to prior x-ray. 

 

MICROBIOLOGY:  Blood culture negative.  Urine showed gram-negative bacilli.

 

REVIEW OF SYSTEMS:

Unable to obtain review of systems due to the patient's medical condition.

 

PHYSICAL EXAMINATION:

GENERAL:  Comfortable in bed, no acute distress, trach and vent.  Feeding tube. 


HEENT: normocephalic, atraumatic   

CV:  S1-S2. no s3, s4 

CHEST:  Decreased breath sounds.  Equal expansion.  No acute distress. 

ABDOMEN:  Soft. non-tender, Positive bowel sounds.   

EXTREMITIES:  Multiple wounds and contracted.



ASSESSMENT:  

1. Leukocytosis, most likely due to urinary tract infection and pneumonia.

2. Urinary tract infection.

3. Pneumonia.

4. Anemia.

5. Multiple wounds.

6. Respiratory failure, chronic.

7. Dysphagia.

8. Aspiration risk.

9. Gastritis/gastroesophageal reflux disease.

10. Hyperlipidemia.

 

PLAN:  

Continue with antibiotics  Follow up with the cultures.  Follow up

with the labs.  



Mini Camarillo M.D.

## 2020-11-28 NOTE — PROGRESS NOTE
DATE:  11/28/2020

 

Medicine Progress Note 

 

SUBJECTIVE:  The patient is still on a mechanical ventilation at baseline bed-bound.  In

a vegetative state, this is her baseline.  No overnight events. 

 

PHYSICAL EXAMINATION:

VITAL SIGNS:  Temperature is 98, pulse 70, respiratory rate 16, blood pressure 115/60,

pulse ox 100% on mechanical ventilation, FiO2 50. 

GENERAL:  She is on a trach on a ventilation.  She is not responsive.  This is her

baseline. 

PULMONARY:  Trach vented baseline, not responsive. 

CARDIOVASCULAR:  Positive S1, S2.  No murmurs, rubs, or gallops appreciated. 

ABDOMEN:  Soft, nondistended, nontender to palpation.  Bowel sounds present. 

MUSCULOSKELETAL:  Unable to assess.  She is in a vegetative state. 

NEUROLOGICAL:  In a vegetative state.  Unable to assess.

LABORATORY DATA:  Show white count 12, hemoglobin 8.2, hematocrit 26, and platelets of

238.  Chemistry; sodium 138, potassium 3.5, chloride 104, bicarb 25, anion gap of 12,

BUN 16, creatinine is 0.6, glucose is 141.  Urinalysis noted.  Toxicology screen

vancomycin trough 17.  Serology; coronavirus nondetected.  Influenza negative. 

 

MICROBIOLOGY:  Blood cultures x2 were found to be no growth.  Urine culture shows

Proteus mirabilis ESBL, gram-negative rods on the 2nd species. 

 

IMAGING STUDIES:  Nothing new.

 

IMPRESSION:  

1. Metabolic encephalopathy.  She has a history of cerebrovascular accident in the past

with craniotomy, in a vegetative state at baseline. 

2. History of cardiac arrest back in October of 2020.

3. Anemia of chronic disease.

4. Sepsis with leukocytosis secondary to extended spectrum beta-lactamases urinary tract

infection. 

5. Stage IV sacral wound.

6. Medically debilitated, very poor prognosis, she is DNR, needs hospice.

 

PLAN:  At this time, urine cultures were noted.  She is on IV Merrem.  Blood cultures no

growth.  Continue with IV antibiotics and ID is following.  As for her hemoglobin is

stable, continue with Protonix for prophylaxis.  Hold anticoagulation due to underlying

anemia.  Pulmonary Critical Care is following as well.  Wound Care is doing local wound

care as well.  She is otherwise at her baseline.  Vegetative state on the vent.  She is

DNR.  Plan to discharge hopefully soon in the next several days to [facility] which she

resides. 

 

 

 

 

______________________________

MD GASTON Akbar/MODL

D:  11/28/2020 13:29:59

T:  11/28/2020 14:02:52

Job #:  [id number]

## 2020-11-29 VITALS — DIASTOLIC BLOOD PRESSURE: 61 MMHG | SYSTOLIC BLOOD PRESSURE: 113 MMHG

## 2020-11-29 VITALS — DIASTOLIC BLOOD PRESSURE: 74 MMHG | SYSTOLIC BLOOD PRESSURE: 115 MMHG

## 2020-11-29 VITALS — DIASTOLIC BLOOD PRESSURE: 70 MMHG | SYSTOLIC BLOOD PRESSURE: 124 MMHG

## 2020-11-29 VITALS — DIASTOLIC BLOOD PRESSURE: 70 MMHG | SYSTOLIC BLOOD PRESSURE: 116 MMHG

## 2020-11-29 VITALS — DIASTOLIC BLOOD PRESSURE: 65 MMHG | SYSTOLIC BLOOD PRESSURE: 108 MMHG

## 2020-11-29 VITALS — DIASTOLIC BLOOD PRESSURE: 73 MMHG | SYSTOLIC BLOOD PRESSURE: 134 MMHG

## 2020-11-29 VITALS — SYSTOLIC BLOOD PRESSURE: 141 MMHG | DIASTOLIC BLOOD PRESSURE: 87 MMHG

## 2020-11-29 VITALS — SYSTOLIC BLOOD PRESSURE: 113 MMHG | DIASTOLIC BLOOD PRESSURE: 70 MMHG

## 2020-11-29 VITALS — SYSTOLIC BLOOD PRESSURE: 125 MMHG | DIASTOLIC BLOOD PRESSURE: 70 MMHG

## 2020-11-29 VITALS — DIASTOLIC BLOOD PRESSURE: 60 MMHG | SYSTOLIC BLOOD PRESSURE: 114 MMHG

## 2020-11-29 VITALS — SYSTOLIC BLOOD PRESSURE: 119 MMHG | DIASTOLIC BLOOD PRESSURE: 72 MMHG

## 2020-11-29 VITALS — SYSTOLIC BLOOD PRESSURE: 130 MMHG | DIASTOLIC BLOOD PRESSURE: 66 MMHG

## 2020-11-29 VITALS — DIASTOLIC BLOOD PRESSURE: 56 MMHG | SYSTOLIC BLOOD PRESSURE: 112 MMHG

## 2020-11-29 VITALS — DIASTOLIC BLOOD PRESSURE: 84 MMHG | SYSTOLIC BLOOD PRESSURE: 143 MMHG

## 2020-11-29 VITALS — DIASTOLIC BLOOD PRESSURE: 75 MMHG | SYSTOLIC BLOOD PRESSURE: 118 MMHG

## 2020-11-29 VITALS — SYSTOLIC BLOOD PRESSURE: 112 MMHG | DIASTOLIC BLOOD PRESSURE: 62 MMHG

## 2020-11-29 VITALS — DIASTOLIC BLOOD PRESSURE: 63 MMHG | SYSTOLIC BLOOD PRESSURE: 111 MMHG

## 2020-11-29 VITALS — DIASTOLIC BLOOD PRESSURE: 68 MMHG | SYSTOLIC BLOOD PRESSURE: 123 MMHG

## 2020-11-29 VITALS — DIASTOLIC BLOOD PRESSURE: 68 MMHG | SYSTOLIC BLOOD PRESSURE: 129 MMHG

## 2020-11-29 VITALS — SYSTOLIC BLOOD PRESSURE: 117 MMHG | DIASTOLIC BLOOD PRESSURE: 58 MMHG

## 2020-11-29 VITALS — DIASTOLIC BLOOD PRESSURE: 70 MMHG | SYSTOLIC BLOOD PRESSURE: 129 MMHG

## 2020-11-29 VITALS — DIASTOLIC BLOOD PRESSURE: 68 MMHG | SYSTOLIC BLOOD PRESSURE: 110 MMHG

## 2020-11-29 VITALS — DIASTOLIC BLOOD PRESSURE: 69 MMHG | SYSTOLIC BLOOD PRESSURE: 116 MMHG

## 2020-11-29 LAB
ANION GAP SERPL CALC-SCNC: 13.1 MMOL/L (ref 8–16)
BASOPHILS # BLD AUTO: 0.1 10*3/UL (ref 0–0.1)
BASOPHILS NFR BLD AUTO: 0.4 % (ref 0–1)
BUN SERPL-MCNC: 14 MG/DL (ref 7–26)
BUN/CREAT SERPL: 24 (ref 6–25)
CALCIUM SERPL-MCNC: 9 MG/DL (ref 8.4–10.2)
CHLORIDE SERPL-SCNC: 103 MMOL/L (ref 98–107)
CO2 SERPL-SCNC: 24 MMOL/L (ref 22–29)
DEPRECATED NEUTROPHILS # BLD AUTO: 10.6 10*3/UL (ref 2.1–6.9)
EGFRCR SERPLBLD CKD-EPI 2021: > 60 ML/MIN (ref 60–?)
EOSINOPHIL # BLD AUTO: 0.4 10*3/UL (ref 0–0.4)
EOSINOPHIL NFR BLD AUTO: 2.9 % (ref 0–6)
ERYTHROCYTE [DISTWIDTH] IN CORD BLOOD: 17.6 % (ref 11.7–14.4)
GLUCOSE SERPLBLD-MCNC: 132 MG/DL (ref 74–118)
HCT VFR BLD AUTO: 28 % (ref 34.2–44.1)
HGB BLD-MCNC: 8.5 G/DL (ref 12–16)
LYMPHOCYTES # BLD: 1.3 10*3/UL (ref 1–3.2)
LYMPHOCYTES NFR BLD AUTO: 9.4 % (ref 18–39.1)
MCH RBC QN AUTO: 24 PG (ref 28–32)
MCHC RBC AUTO-ENTMCNC: 30.4 G/DL (ref 31–35)
MCV RBC AUTO: 79.1 FL (ref 81–99)
MONOCYTES # BLD AUTO: 1.1 10*3/UL (ref 0.2–0.8)
MONOCYTES NFR BLD AUTO: 7.9 % (ref 4.4–11.3)
NEUTS SEG NFR BLD AUTO: 77.9 % (ref 38.7–80)
PLATELET # BLD AUTO: 245 X10E3/UL (ref 140–360)
POTASSIUM SERPL-SCNC: 4.1 MMOL/L (ref 3.5–5.1)
RBC # BLD AUTO: 3.54 X10E6/UL (ref 3.6–5.1)
SODIUM SERPL-SCNC: 136 MMOL/L (ref 136–145)

## 2020-11-29 RX ADMIN — MEROPENEM SCH MLS/HR: 500 INJECTION INTRAVENOUS at 14:25

## 2020-11-29 RX ADMIN — LEVOTHYROXINE SODIUM SCH MCG: 50 TABLET ORAL at 08:18

## 2020-11-29 RX ADMIN — IPRATROPIUM BROMIDE AND ALBUTEROL SULFATE SCH ML: .5; 2.5 SOLUTION RESPIRATORY (INHALATION) at 07:55

## 2020-11-29 RX ADMIN — CARVEDILOL SCH MG: 12.5 TABLET, FILM COATED ORAL at 16:14

## 2020-11-29 RX ADMIN — MEROPENEM SCH MLS/HR: 500 INJECTION INTRAVENOUS at 07:40

## 2020-11-29 RX ADMIN — HEPARIN SODIUM SCH UNIT: 5000 INJECTION INTRAVENOUS; SUBCUTANEOUS at 14:26

## 2020-11-29 RX ADMIN — ASPIRIN 81 MG CHEWABLE TABLET SCH MG: 81 TABLET CHEWABLE at 08:18

## 2020-11-29 RX ADMIN — Medication SCH MG: at 07:55

## 2020-11-29 RX ADMIN — IPRATROPIUM BROMIDE AND ALBUTEROL SULFATE SCH ML: .5; 2.5 SOLUTION RESPIRATORY (INHALATION) at 13:45

## 2020-11-29 RX ADMIN — HEPARIN SODIUM SCH UNIT: 5000 INJECTION INTRAVENOUS; SUBCUTANEOUS at 01:34

## 2020-11-29 RX ADMIN — Medication SCH MG: at 14:00

## 2020-11-29 RX ADMIN — MEROPENEM SCH MLS/HR: 500 INJECTION INTRAVENOUS at 20:31

## 2020-11-29 RX ADMIN — POLYETHYLENE GLYCOL 3350 SCH GM: 17 POWDER, FOR SOLUTION ORAL at 08:18

## 2020-11-29 RX ADMIN — Medication SCH MG: at 00:05

## 2020-11-29 RX ADMIN — VANCOMYCIN HYDROCHLORIDE SCH MLS/HR: 1 INJECTION, SOLUTION INTRAVENOUS at 22:52

## 2020-11-29 RX ADMIN — SODIUM CHLORIDE SCH MG: 900 INJECTION INTRAVENOUS at 08:18

## 2020-11-29 RX ADMIN — SODIUM CHLORIDE SCH MG: 900 INJECTION INTRAVENOUS at 16:11

## 2020-11-29 RX ADMIN — IPRATROPIUM BROMIDE AND ALBUTEROL SULFATE SCH ML: .5; 2.5 SOLUTION RESPIRATORY (INHALATION) at 19:55

## 2020-11-29 RX ADMIN — Medication SCH MG: at 20:31

## 2020-11-29 RX ADMIN — CARVEDILOL SCH MG: 12.5 TABLET, FILM COATED ORAL at 10:37

## 2020-11-29 RX ADMIN — MEROPENEM SCH MLS/HR: 500 INJECTION INTRAVENOUS at 01:40

## 2020-11-29 RX ADMIN — SODIUM HYPOCHLORITE SCH ML: 2.5 SOLUTION TOPICAL at 10:38

## 2020-11-29 NOTE — NUR
Dr. Stanton notified of blood noticed in urine, pinkish in color. New order received to DC 
heparin.

## 2020-11-29 NOTE — PROGRESS NOTE
DATE:  11/29/2020

 

Medicine Progress Note. 

 

SUBJECTIVE:  The patient is still at her vegetative state with no change.  This is her

baseline.  No overnight events. 

 

PHYSICAL EXAMINATION:

VITAL SIGNS:  Temperature is 98.6, pulse 70, respiratory rate 16, and blood pressure is

110/68, 100% pulse ox.  Mechanical ventilation, FiO2 of 50. 

GENERAL:  She is trached, vegetative state at baseline, not responsive. 

PULMONARY:  Trach ventilated at baseline. 

CARDIOVASCULAR:  Positive S1 and S2.  No murmurs, rubs, or gallops. 

ABDOMEN:  Soft, nondistended, nontender to palpation.  Bowel sounds present. 

MUSCULOSKELETAL:  Unable to assess. 

NEUROLOGICAL:  Unable to assess.

LABS:  Show white count was 13, hemoglobin 8.5, hematocrit is 28, platelets of 245.

Chemistry, sodium 136, potassium 4.1, chloride 103, bicarb 24, anion gap of 13, BUN is

14, creatinine 0.58. 

 

MICROBIOLOGY:  Urine cultures were noted.

 

IMAGING STUDIES:  Nothing new.

 

IMPRESSION:  

1. Metabolic encephalopathy with history of CVA in the past with craniotomy in a

vegetative state at baseline. 

2. History of cardiac arrest back in October of 2020.

3. Anemia of chronic disease.

4. Sepsis with leukocytosis secondary to ESBL urinary tract infection.

5. Stage IV sacral wound.

6. Medically debilitated, very poor prognosis.  She is DNR, needs hospice.

 

PLAN:  At this time, urine cultures were noted.  She is on IV antibiotics, being managed

by Infectious Disease.  Blood cultures no growth today.  Hemoglobin is stable.  Hold

anticoagulation due to underlying anemia.  Pulmonary Critical Care is following in terms

of mechanical ventilation and critical care management.  She is at her baseline in a

vegetative state and not responsive.  She is DNR.  Possibly discharged over the next 1

to 2 days.  Once I get the final recommendations by ID in terms of antibiotic therapy,

she will go back to [facility] as [facility]. 

 

 

 

 

______________________________

MD GASTON Akbar/MODL

D:  11/29/2020 17:18:49

T:  11/29/2020 18:42:55

Job #:  [id number]

## 2020-11-29 NOTE — PROGRESS NOTE
DATE:    

 

SUBJECTIVE:  The patient is in persistent vegetative state, not responsive.  This is her

baseline.  She is vent dependent. 

 

PHYSICAL EXAMINATION:

VITAL SIGNS:  Temperature 98.3, pulse of 80, blood pressure 143/84, respiratory rate is

18, the patient is on mechanical ventilator, FiO2 of 40%. 

HEENT:  The patient has history of craniotomy.  The patient has Shiley 6-cuffed

tracheostomy. 

CHEST:  Clear to auscultation bilaterally.  Occasional reduced air entry. 

HEART:  S1, S2 audible. 

ABDOMEN:  Soft. 

NEUROLOGIC:  Mental status:  The patient is baseline unresponsive and is in persistent

vegetative state. 

LABORATORY DATA:  Blood cultures, no growth after 72 hours.  Urine culture, Proteus ESBL

and Klebsiella.  White count of 13,000, hemoglobin 8.5, platelets 245,000.  Chemistry

reviewed. 

 

ASSESSMENT/PLAN:  Ms. Singer is a 75-year-old female in persistent vegetative state,

history of craniotomy and stroke in the past.  The [facility] is [facility] dependent [facility].  Current problem: 

1. Persistent vegetative state, history of craniotomy and stroke in the past that is her

baseline. 

2. Vent dependent cannot be wean from the ventilator.

3. History of cardiac arrest in October when she was admitted here, sepsis and

leukocytosis with ESBL in the urine. 

 

PLAN:  Continue the patient on current antibiotics.  Continue vent support.  The patient

will be continued on Mucomyst.  Nebulizer treatment as ordered.  Heparin subcu for DVT

prophylaxis.  Overall the patient's prognosis is very poor. 

 

Critical care time spent 40 minutes.

 

 

 

 

______________________________

MD BENJA Moss/RUDDY

D:  11/29/2020 12:43:43

T:  11/29/2020 13:30:50

Job #:  [id number]

## 2020-11-30 VITALS — DIASTOLIC BLOOD PRESSURE: 77 MMHG | SYSTOLIC BLOOD PRESSURE: 120 MMHG

## 2020-11-30 VITALS — DIASTOLIC BLOOD PRESSURE: 51 MMHG | SYSTOLIC BLOOD PRESSURE: 102 MMHG

## 2020-11-30 VITALS — SYSTOLIC BLOOD PRESSURE: 97 MMHG | DIASTOLIC BLOOD PRESSURE: 56 MMHG

## 2020-11-30 VITALS — SYSTOLIC BLOOD PRESSURE: 115 MMHG | DIASTOLIC BLOOD PRESSURE: 69 MMHG

## 2020-11-30 VITALS — SYSTOLIC BLOOD PRESSURE: 103 MMHG | DIASTOLIC BLOOD PRESSURE: 54 MMHG

## 2020-11-30 VITALS — DIASTOLIC BLOOD PRESSURE: 67 MMHG | SYSTOLIC BLOOD PRESSURE: 115 MMHG

## 2020-11-30 VITALS — SYSTOLIC BLOOD PRESSURE: 99 MMHG | DIASTOLIC BLOOD PRESSURE: 55 MMHG

## 2020-11-30 VITALS — DIASTOLIC BLOOD PRESSURE: 58 MMHG | SYSTOLIC BLOOD PRESSURE: 108 MMHG

## 2020-11-30 VITALS — SYSTOLIC BLOOD PRESSURE: 117 MMHG | DIASTOLIC BLOOD PRESSURE: 65 MMHG

## 2020-11-30 VITALS — SYSTOLIC BLOOD PRESSURE: 112 MMHG | DIASTOLIC BLOOD PRESSURE: 69 MMHG

## 2020-11-30 VITALS — DIASTOLIC BLOOD PRESSURE: 57 MMHG | SYSTOLIC BLOOD PRESSURE: 100 MMHG

## 2020-11-30 VITALS — DIASTOLIC BLOOD PRESSURE: 69 MMHG | SYSTOLIC BLOOD PRESSURE: 123 MMHG

## 2020-11-30 VITALS — SYSTOLIC BLOOD PRESSURE: 103 MMHG | DIASTOLIC BLOOD PRESSURE: 62 MMHG

## 2020-11-30 VITALS — DIASTOLIC BLOOD PRESSURE: 52 MMHG | SYSTOLIC BLOOD PRESSURE: 98 MMHG

## 2020-11-30 VITALS — SYSTOLIC BLOOD PRESSURE: 110 MMHG | DIASTOLIC BLOOD PRESSURE: 62 MMHG

## 2020-11-30 VITALS — DIASTOLIC BLOOD PRESSURE: 69 MMHG | SYSTOLIC BLOOD PRESSURE: 120 MMHG

## 2020-11-30 VITALS — DIASTOLIC BLOOD PRESSURE: 72 MMHG | SYSTOLIC BLOOD PRESSURE: 131 MMHG

## 2020-11-30 VITALS — SYSTOLIC BLOOD PRESSURE: 101 MMHG | DIASTOLIC BLOOD PRESSURE: 67 MMHG

## 2020-11-30 VITALS — SYSTOLIC BLOOD PRESSURE: 127 MMHG | DIASTOLIC BLOOD PRESSURE: 69 MMHG

## 2020-11-30 VITALS — SYSTOLIC BLOOD PRESSURE: 104 MMHG | DIASTOLIC BLOOD PRESSURE: 63 MMHG

## 2020-11-30 VITALS — SYSTOLIC BLOOD PRESSURE: 90 MMHG | DIASTOLIC BLOOD PRESSURE: 56 MMHG

## 2020-11-30 VITALS — DIASTOLIC BLOOD PRESSURE: 62 MMHG | SYSTOLIC BLOOD PRESSURE: 103 MMHG

## 2020-11-30 VITALS — SYSTOLIC BLOOD PRESSURE: 105 MMHG | DIASTOLIC BLOOD PRESSURE: 70 MMHG

## 2020-11-30 LAB
ANION GAP SERPL CALC-SCNC: 9.8 MMOL/L (ref 8–16)
BASOPHILS # BLD AUTO: 0.1 10*3/UL (ref 0–0.1)
BASOPHILS NFR BLD AUTO: 0.6 % (ref 0–1)
BUN SERPL-MCNC: 14 MG/DL (ref 7–26)
BUN/CREAT SERPL: 29 (ref 6–25)
CALCIUM SERPL-MCNC: 8.6 MG/DL (ref 8.4–10.2)
CHLORIDE SERPL-SCNC: 105 MMOL/L (ref 98–107)
CO2 SERPL-SCNC: 25 MMOL/L (ref 22–29)
DEPRECATED NEUTROPHILS # BLD AUTO: 8.4 10*3/UL (ref 2.1–6.9)
EGFRCR SERPLBLD CKD-EPI 2021: > 60 ML/MIN (ref 60–?)
EOSINOPHIL # BLD AUTO: 0.4 10*3/UL (ref 0–0.4)
EOSINOPHIL NFR BLD AUTO: 3.7 % (ref 0–6)
ERYTHROCYTE [DISTWIDTH] IN CORD BLOOD: 18.1 % (ref 11.7–14.4)
GLUCOSE SERPLBLD-MCNC: 134 MG/DL (ref 74–118)
HCT VFR BLD AUTO: 27 % (ref 34.2–44.1)
HGB BLD-MCNC: 8.2 G/DL (ref 12–16)
LYMPHOCYTES # BLD: 1.3 10*3/UL (ref 1–3.2)
LYMPHOCYTES NFR BLD AUTO: 11.1 % (ref 18–39.1)
MCH RBC QN AUTO: 24 PG (ref 28–32)
MCHC RBC AUTO-ENTMCNC: 30.4 G/DL (ref 31–35)
MCV RBC AUTO: 78.9 FL (ref 81–99)
MONOCYTES # BLD AUTO: 1 10*3/UL (ref 0.2–0.8)
MONOCYTES NFR BLD AUTO: 8.7 % (ref 4.4–11.3)
NEUTS SEG NFR BLD AUTO: 74.6 % (ref 38.7–80)
PLATELET # BLD AUTO: 222 X10E3/UL (ref 140–360)
POTASSIUM SERPL-SCNC: 3.8 MMOL/L (ref 3.5–5.1)
RBC # BLD AUTO: 3.42 X10E6/UL (ref 3.6–5.1)
SODIUM SERPL-SCNC: 136 MMOL/L (ref 136–145)

## 2020-11-30 RX ADMIN — IPRATROPIUM BROMIDE AND ALBUTEROL SULFATE SCH ML: .5; 2.5 SOLUTION RESPIRATORY (INHALATION) at 08:05

## 2020-11-30 RX ADMIN — MEROPENEM SCH MLS/HR: 500 INJECTION INTRAVENOUS at 02:20

## 2020-11-30 RX ADMIN — IPRATROPIUM BROMIDE AND ALBUTEROL SULFATE SCH ML: .5; 2.5 SOLUTION RESPIRATORY (INHALATION) at 01:10

## 2020-11-30 RX ADMIN — SODIUM HYPOCHLORITE SCH ML: 2.5 SOLUTION TOPICAL at 05:41

## 2020-11-30 RX ADMIN — SODIUM CHLORIDE SCH MG: 900 INJECTION INTRAVENOUS at 16:18

## 2020-11-30 RX ADMIN — CARVEDILOL SCH MG: 12.5 TABLET, FILM COATED ORAL at 16:18

## 2020-11-30 RX ADMIN — Medication SCH MG: at 19:55

## 2020-11-30 RX ADMIN — VANCOMYCIN HYDROCHLORIDE SCH MLS/HR: 1 INJECTION, SOLUTION INTRAVENOUS at 23:18

## 2020-11-30 RX ADMIN — SODIUM CHLORIDE SCH MG: 900 INJECTION INTRAVENOUS at 09:30

## 2020-11-30 RX ADMIN — ASPIRIN 81 MG CHEWABLE TABLET SCH MG: 81 TABLET CHEWABLE at 09:30

## 2020-11-30 RX ADMIN — Medication SCH MG: at 08:05

## 2020-11-30 RX ADMIN — MEROPENEM SCH MLS/HR: 500 INJECTION INTRAVENOUS at 09:30

## 2020-11-30 RX ADMIN — LEVOTHYROXINE SODIUM SCH MCG: 50 TABLET ORAL at 09:30

## 2020-11-30 RX ADMIN — IPRATROPIUM BROMIDE AND ALBUTEROL SULFATE SCH ML: .5; 2.5 SOLUTION RESPIRATORY (INHALATION) at 20:54

## 2020-11-30 RX ADMIN — POLYETHYLENE GLYCOL 3350 SCH GM: 17 POWDER, FOR SOLUTION ORAL at 09:30

## 2020-11-30 RX ADMIN — Medication SCH MG: at 21:03

## 2020-11-30 RX ADMIN — MEROPENEM SCH MLS/HR: 500 INJECTION INTRAVENOUS at 13:29

## 2020-11-30 RX ADMIN — IPRATROPIUM BROMIDE AND ALBUTEROL SULFATE SCH ML: .5; 2.5 SOLUTION RESPIRATORY (INHALATION) at 15:40

## 2020-11-30 RX ADMIN — CARVEDILOL SCH MG: 12.5 TABLET, FILM COATED ORAL at 09:30

## 2020-11-30 RX ADMIN — MEROPENEM SCH MLS/HR: 500 INJECTION INTRAVENOUS at 19:45

## 2020-11-30 NOTE — NUR
Called and spoke to pt's sister Carmencita Staton regarding plan to transfer back to SNF in the 
next day or so. [facility] Gave 
permission to send clinicals to them. 

Choice letter for [facility] placed in front of chart. 

Will send clinicals once order received.

## 2020-11-30 NOTE — PROGRESS NOTE
DATE:    

 

SUBJECTIVE:  Ms. Singer remains in intensive care unit.  This is a 75-year-old 

American female from a nursing home, noncommunicative, CVA, congestive heart failure,

dysphagia, debility, bedbound, comes in with sepsis, aspiration pneumonia/UTI.

Clinically significantly improved. 

 

PHYSICAL EXAMINATION:

GENERAL:  Currently alert, noncommunicative. 

VITAL SIGNS:  Stable, afebrile. 

HEENT:  She is not icteric. 

NECK:  Supple. 

CHEST:  Crackles. 

HEART:  S1 and S2. 

ABDOMEN:  Soft.

IMPRESSION:  Sepsis, aspiration pneumonia, urinary tract infection, __________,

complicated medical history as mentioned above. 

 

PLAN:  To continue with antibiotic as ordered.  Finish 14 days.  Currently on meropenem.

 We will follow. 

 

 

 

 

______________________________

MD JOEL Purcell/RUDDY

D:  11/30/2020 16:09:07

T:  11/30/2020 17:26:51

Job #:  [id number]

## 2020-11-30 NOTE — PROGRESS NOTE
DATE:    

 

SUBJECTIVE:  The patient is in persistent vegetative state and ventilator dependent.

 

PHYSICAL EXAMINATION:

VITAL SIGNS:  Temperature 97.8, pulse of 60, blood pressure 101/67, respiratory rate is

18, and O2 saturation is 100% on mechanical ventilator 40%. 

HEENT:  Head is atraumatic. The patient has a tracheostomy, contracted with persistent

vegetative state. 

LABORATORY DATA:  Reviewed.

 

ASSESSMENT:  

1. Metabolic encephalopathy, altered mental status, history of cerebrovascular accident

and history of craniotomy.  The patient is in persistent vegetative state that is her

baseline.  History of cardiac arrest in October of 2020. 

2. Chronic ventilator dependence and chronic tracheostomy, ESBL, urinary tract infection.

 

PLAN:  Continue the patient on ventilatory support and she cannot be weaned from the

ventilator.  IV antibiotics per ID.  The patient can go back to [facility] or [facility]

for prolonged antibiotic secondary to ESBL and UTI.  She has extremely poor prognosis. 

 

 

 

 

______________________________

MD BENJA Moss/RUDDY

D:  11/30/2020 19:22:08

T:  11/30/2020 19:37:18

Job #:  [id number]

## 2020-11-30 NOTE — NUR
Received order for SNF. Pt will need 10 days of Merrem. 



Clinical sent to [facility].

Ava with admissions was notified. 

Phone [phone]

Fax [phone]

Email: [email or web address]

## 2020-12-01 VITALS — SYSTOLIC BLOOD PRESSURE: 104 MMHG | DIASTOLIC BLOOD PRESSURE: 60 MMHG

## 2020-12-01 VITALS — DIASTOLIC BLOOD PRESSURE: 60 MMHG | SYSTOLIC BLOOD PRESSURE: 102 MMHG

## 2020-12-01 VITALS — DIASTOLIC BLOOD PRESSURE: 62 MMHG | SYSTOLIC BLOOD PRESSURE: 107 MMHG

## 2020-12-01 VITALS — SYSTOLIC BLOOD PRESSURE: 105 MMHG | DIASTOLIC BLOOD PRESSURE: 57 MMHG

## 2020-12-01 VITALS — SYSTOLIC BLOOD PRESSURE: 113 MMHG | DIASTOLIC BLOOD PRESSURE: 59 MMHG

## 2020-12-01 VITALS — DIASTOLIC BLOOD PRESSURE: 55 MMHG | SYSTOLIC BLOOD PRESSURE: 97 MMHG

## 2020-12-01 VITALS — DIASTOLIC BLOOD PRESSURE: 64 MMHG | SYSTOLIC BLOOD PRESSURE: 113 MMHG

## 2020-12-01 VITALS — SYSTOLIC BLOOD PRESSURE: 102 MMHG | DIASTOLIC BLOOD PRESSURE: 57 MMHG

## 2020-12-01 VITALS — SYSTOLIC BLOOD PRESSURE: 107 MMHG | DIASTOLIC BLOOD PRESSURE: 53 MMHG

## 2020-12-01 VITALS — SYSTOLIC BLOOD PRESSURE: 112 MMHG | DIASTOLIC BLOOD PRESSURE: 72 MMHG

## 2020-12-01 VITALS — SYSTOLIC BLOOD PRESSURE: 106 MMHG | DIASTOLIC BLOOD PRESSURE: 59 MMHG

## 2020-12-01 VITALS — DIASTOLIC BLOOD PRESSURE: 58 MMHG | SYSTOLIC BLOOD PRESSURE: 109 MMHG

## 2020-12-01 VITALS — SYSTOLIC BLOOD PRESSURE: 103 MMHG | DIASTOLIC BLOOD PRESSURE: 65 MMHG

## 2020-12-01 VITALS — DIASTOLIC BLOOD PRESSURE: 59 MMHG | SYSTOLIC BLOOD PRESSURE: 108 MMHG

## 2020-12-01 VITALS — DIASTOLIC BLOOD PRESSURE: 57 MMHG | SYSTOLIC BLOOD PRESSURE: 107 MMHG

## 2020-12-01 VITALS — SYSTOLIC BLOOD PRESSURE: 109 MMHG | DIASTOLIC BLOOD PRESSURE: 64 MMHG

## 2020-12-01 LAB
ALBUMIN SERPL-MCNC: 1.7 G/DL (ref 3.5–5)
ALBUMIN/GLOB SERPL: 0.3 {RATIO} (ref 0.8–2)
ALP SERPL-CCNC: 87 IU/L (ref 40–150)
ALT SERPL-CCNC: 10 IU/L (ref 0–55)
ANION GAP SERPL CALC-SCNC: 12.8 MMOL/L (ref 8–16)
BASOPHILS # BLD AUTO: 0 10*3/UL (ref 0–0.1)
BASOPHILS NFR BLD AUTO: 0.4 % (ref 0–1)
BUN SERPL-MCNC: 12 MG/DL (ref 7–26)
BUN/CREAT SERPL: 22 (ref 6–25)
CALCIUM SERPL-MCNC: 8.5 MG/DL (ref 8.4–10.2)
CHLORIDE SERPL-SCNC: 105 MMOL/L (ref 98–107)
CO2 SERPL-SCNC: 24 MMOL/L (ref 22–29)
DEPRECATED NEUTROPHILS # BLD AUTO: 7.3 10*3/UL (ref 2.1–6.9)
EGFRCR SERPLBLD CKD-EPI 2021: > 60 ML/MIN (ref 60–?)
EOSINOPHIL # BLD AUTO: 0.4 10*3/UL (ref 0–0.4)
EOSINOPHIL NFR BLD AUTO: 3.5 % (ref 0–6)
ERYTHROCYTE [DISTWIDTH] IN CORD BLOOD: 18.2 % (ref 11.7–14.4)
GLOBULIN PLAS-MCNC: 5.4 G/DL (ref 2.3–3.5)
GLUCOSE SERPLBLD-MCNC: 128 MG/DL (ref 74–118)
HCT VFR BLD AUTO: 27.6 % (ref 34.2–44.1)
HGB BLD-MCNC: 8.4 G/DL (ref 12–16)
LYMPHOCYTES # BLD: 1.1 10*3/UL (ref 1–3.2)
LYMPHOCYTES NFR BLD AUTO: 11.6 % (ref 18–39.1)
MCH RBC QN AUTO: 24.3 PG (ref 28–32)
MCHC RBC AUTO-ENTMCNC: 30.4 G/DL (ref 31–35)
MCV RBC AUTO: 80 FL (ref 81–99)
MONOCYTES # BLD AUTO: 0.9 10*3/UL (ref 0.2–0.8)
MONOCYTES NFR BLD AUTO: 9 % (ref 4.4–11.3)
NEUTS SEG NFR BLD AUTO: 74.4 % (ref 38.7–80)
PLATELET # BLD AUTO: 220 X10E3/UL (ref 140–360)
POTASSIUM SERPL-SCNC: 3.8 MMOL/L (ref 3.5–5.1)
RBC # BLD AUTO: 3.45 X10E6/UL (ref 3.6–5.1)
SODIUM SERPL-SCNC: 138 MMOL/L (ref 136–145)

## 2020-12-01 RX ADMIN — MEROPENEM SCH MLS/HR: 500 INJECTION INTRAVENOUS at 08:38

## 2020-12-01 RX ADMIN — MEROPENEM SCH MLS/HR: 500 INJECTION INTRAVENOUS at 02:10

## 2020-12-01 RX ADMIN — SODIUM HYPOCHLORITE SCH ML: 2.5 SOLUTION TOPICAL at 08:38

## 2020-12-01 RX ADMIN — IPRATROPIUM BROMIDE AND ALBUTEROL SULFATE SCH ML: .5; 2.5 SOLUTION RESPIRATORY (INHALATION) at 00:15

## 2020-12-01 RX ADMIN — IPRATROPIUM BROMIDE AND ALBUTEROL SULFATE SCH ML: .5; 2.5 SOLUTION RESPIRATORY (INHALATION) at 12:40

## 2020-12-01 RX ADMIN — ASPIRIN 81 MG CHEWABLE TABLET SCH MG: 81 TABLET CHEWABLE at 08:38

## 2020-12-01 RX ADMIN — CARVEDILOL SCH MG: 12.5 TABLET, FILM COATED ORAL at 08:38

## 2020-12-01 RX ADMIN — IPRATROPIUM BROMIDE AND ALBUTEROL SULFATE SCH ML: .5; 2.5 SOLUTION RESPIRATORY (INHALATION) at 07:15

## 2020-12-01 RX ADMIN — POLYETHYLENE GLYCOL 3350 SCH GM: 17 POWDER, FOR SOLUTION ORAL at 08:40

## 2020-12-01 RX ADMIN — Medication SCH MG: at 07:26

## 2020-12-01 RX ADMIN — MEROPENEM SCH MLS/HR: 500 INJECTION INTRAVENOUS at 13:38

## 2020-12-01 RX ADMIN — SODIUM CHLORIDE SCH MG: 900 INJECTION INTRAVENOUS at 08:38

## 2020-12-01 NOTE — PROGRESS NOTE
DATE:    

 

SUBJECTIVE:  The patient is in persistent vegetative state.  No response.

 

OBJECTIVE:  VITAL SIGNS:  Temperature 98.2, pulse of 71, blood pressure 113/59,

respiratory rate of 18, and O2 saturation 99%.  She is on mechanical ventilator, 50%

FiO2. 

CHEST:  Decreased air entry. 

HEART:  S1, S2 audible. 

ABDOMEN:  Soft. 

EXTREMITIES:  No pedal edema.

 

LABORATORY DATA:  White cell count is down to 9000, hemoglobin 8.4.  BNP is improved as

well. 

 

ASSESSMENT:  Ms. Singer is a 75-year-old female, persistent vegetative state, has a trach

and PEG.  The patient is vent dependent. 

 

Current problems:

1. Altered mental status and persistent vegetative state secondary to history of stroke,

history of recent cardiac arrest.  The patient is debilitated and malnourished. 

2. History of craniotomy in remote past.

3. Tracheostomy and gastrostomy status.

4. Urinary tract infection.

 

PLAN:  IV antibiotics per ID recommendations.  The patient is vent dependent and can be

transferred back to [facility] when cleared by other services for IV antibiotics.

The patient has a PICC line. 

 

 

 

 

______________________________

MD BENJA Moss/RUDDY

D:  12/01/2020 10:16:07

T:  12/01/2020 11:49:38

Job #:  [id number]

## 2020-12-01 NOTE — NUR
Per Dr. Rao patient can be discharged back to [facility] today. Orders given to 
continue meropenem for 2 more weeks, stop date Decemeber 15th (this is noted on transfer mar 
done by dr. rao). Case management made aware that there is no out of hospital dnr, case 
management working on obtaining from [facility]. Dr. rao informed as well of lack of 
out of hospital dnr, no new orders. report called to [facility]. [facility] picked up patient 
and respiratory at bedside. Patient does not appear to be in any s/s of distress at this 
time.

## 2020-12-01 NOTE — NUR
INFECTIOUS DISEASE PROGRESS NOTE

DR. MINI CAMARILLO   

 

The patient is seen and evaluated, available labs and notes reviewed.

 

HISTORY OF PRESENT ILLNESS:  This is a 75-year-old  female, who 
was

transferred from skilled nursing facility due to anemia with hemoglobin of 6.6 
and

leukocytosis of 25,000 per my discussion and verbal report from nursing staff.  
The

patient was afebrile on the day of transfer.  Overall, the patient 
nonresponsive in a

vegetative state and no other symptoms were reported. 

 

PAST MEDICAL HISTORY:  CVA, chronic respiratory failure, dysphagia, 
debility/bedbound,

vegetative state, hypothyroidism, emphysema, hypertension, multiple wounds, 
contracted

extremities. 

 

ALLERGIES:  NO KNOWN ALLERGIES.

 

MEDICATION LIST:  The patient is on vancomycin IV and meropenem from ID point 
of view.

 

LABORATORY STUDIES:  per chart 



RADIOLOGY STUDIES:  per chart 

 

MICROBIOLOGY:  Blood culture negative.  Urine showed gram-negative bacilli.

 

REVIEW OF SYSTEMS:

Unable to obtain review of systems due to the patient's medical condition.

 

PHYSICAL EXAMINATION:

GENERAL:  Comfortable in bed, no acute distress, trach and vent.  Feeding tube. 


HEENT: normocephalic, atraumatic   

CV:  S1-S2. no s3, s4 

CHEST:  Decreased breath sounds.  Equal expansion.  No acute distress. 

ABDOMEN:  Soft. non-tender, Positive bowel sounds.   

EXTREMITIES:  Multiple wounds and contracted.

Neuro: unable to obtain 



ASSESSMENT:  

1. Leukocytosis, most likely due to urinary tract infection and pneumonia.

2. Urinary tract infection.

3. Pneumonia.

4. Anemia.

5. Multiple wounds.

6. Respiratory failure, chronic.

7. Dysphagia.

8. Aspiration risk.

9. Gastritis/gastroesophageal reflux disease.

10. Hyperlipidemia.

 

PLAN:  

continue with merrem 

culture results noted 

supportive care 



Mini Camarillo M.D.

## 2020-12-01 NOTE — PROGRESS NOTE
DATE:  11/30/2020

 

Medicine Progress Note 

 

SUBJECTIVE:  Still at baseline.  I spoke with ID, recommends two weeks of IV Merrem

antibiotics.  The patient's current status with the other urine seems to be normal

colonization.  No change overnight.  She is at her baseline. 

 

PHYSICAL EXAMINATION:

VITAL SIGNS:  Temperature is 98.2, pulse 76, respiratory rate 16, blood pressure is

113/64, pulse ox 100%, mechanical ventilation, FiO2 of 50. 

GENERAL:  Vegetative state mechanical ventilator at baseline. 

CARDIOVASCULAR:  Positive S1, S2.  No murmurs, rubs, or gallops appreciated. 

ABDOMEN:  Soft, nondistended, nontender to palpation.  Bowel sounds present. 

MUSCULOSKELETAL:  Unable to assess, vegetative state. 

NEUROLOGICAL:  Vegetative state, unable to assess.

LABORATORY DATA:  Show white count 11, hemoglobin 8.2, hematocrit 27, and platelets of

222.  Chemistry reviewed, stable. 

 

MICROBIOLOGY:  Urine cultures were noted.  Blood cultures, no growth.

 

IMPRESSION:  

1. Metabolic encephalopathy with history of cerebrovascular accident in the past with

craniotomy and vegetative stated at baseline. 

2. History of cardiac arrest back in October 2020.

3. Anemia of chronic disease.

4. Sepsis with leukocytosis secondary to extended spectrum beta-lactamases urinary tract

infections. 

5. Stage IV sacral wound.

6. Medically debilitated, very poor prognosis, DNR.  She is DNR, needs hospice.

 

PLAN:  At this time, I spoke with ID, recommends two weeks of IV Merrem.  The Klebsiella

pneumoniae in the urine.  He feels that there is likely a normal colonization and just

need only Merrem at this time.  Her hemoglobin is stable.  Get morning labs.

Consultants involved Pulmonary Critical Care.  She is in vegetative state, not

responsive.  This is her norm.  She is DNR.  Plan is discharge back to skilled nursing,

potentially [facility].  Plan of care discussed with Case Management

consult. 

 

 

 

 

______________________________

MD GASTON Akbar/RUDDY

D:  12/01/2020 07:45:07

T:  12/01/2020 08:27:27

Job #:  [id number]

## 2020-12-01 NOTE — NUR
SKILLED NURSING FACILITY DISCHARGE INFORMATION 



PATIENT HAS BEEN ACCEPTED TO: 

[facility]

[address]

[place] [address]



ACCEPTING : Laurence HDZ MD: Dr. Plascencia

ROOM: [id number] 

NURSE CALL REPORT TO: [phone], ask for 500 germain nurse 

IMM SIGNED AND OBTAINED (if applicable): spoke with sister Carmencita. She verbalized 
understanding. Signed copy in chart.

THE FOLLOWING DOCUMENTS MUST ACCOMPANY PATIENT FOR TRANSFER: copy of chart, transfer MAR 

COPIED CHART: CM 

RTF: completed and given to SANJU Sen

## 2020-12-02 NOTE — DISCHARGE SUMMARY
FINAL DISCHARGE DIAGNOSES:  

1. Metabolic encephalopathy at baseline with history of cerebrovascular accident in the

past and vegetative state, history of craniotomy.  This is her baseline on a mechanical

ventilation. 

2. History of cardiac arrest back in October 2020.

3. Anemia of chronic disease.

4. Sepsis with leukocytosis, secondary to extended-spectrum beta-lactamase urinary tract

infection. 

5. Stage IV sacral wound, chronic.

6. Medically debilitated, very poor prognosis, DNR.

7. The patient is currently at her baseline vegetative state from a history of a

craniotomy. 

 

CONSULTANTS:  Pulmonary Critical Care and ID.

 

PHYSICAL EXAMINATION:

VITAL SIGNS:  Temperature is 98, pulse 67, respiratory rate 16, blood pressure 103/65,

pulse ox 100% on mechanical ventilation, FiO2 of 50%. 

LABORATORY DATA:  Labs show white count 9.8, hemoglobin 8.4, hematocrit 27.6, and

platelets 220.  Chemistry; sodium 138, potassium 3.8, chloride 105, bicarb 24, anion gap

of 12, BUN 12, creatinine 0.55. 

 

MICROBIOLOGY:  Blood cultures, no growth to date.  Urine culture, shows Proteus ESBL and

Klebsiella pneumoniae. 

 

IMAGING STUDIES:  Chest x-ray shows diffuse bilateral airspace opacities, representing

edema or pneumonia. 

 

HOSPITAL COURSE:  A 75-year-old  female with history of craniotomy in a

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

currently resides at [facility].  She came in with underlying fever, was treated for

underlying sepsis.  The patient maintained on broad-spectrum IV antibiotics.  She

maintained on mechanical ventilation.  Pulmonary Critical Care and ID were consulted.

Blood cultures were negative.  Urine culture shows Proteus mirabilis ESBL Klebsiella

pneumoniae.  The patient maintained on IV Merrem.  She had a PICC line inserted and she

will continue with IV Merrem for two additional more weeks.  Please see med

reconciliation for final details.  While here, the patient improved.  Pulmonary Critical

Care was managing her.  She is chronically on a tracheostomy, on mechanical ventilation.

 She was apparently still at her baseline.  No change.  I spoke with the family, who was

a sister over the phone and she wanted her sister to be DNR.  Also, local wound care was

provided for the sacral wound.  The patient was cleared for discharge by all

consultants.  She was afebrile prior to being discharged to [facility].  On the day

of discharge, vital signs were stable.  Labs were reviewed and stable.  The patient was

seen, evaluated, and examined thoroughly on the day of discharge.  No other complaints.

The [facility] will be [facility] to [facility] for further management and care. 

 

MEDICATIONS:  See med reconciliation form.

 

DISPOSITION:  [facility] facility.

 

CONDITION:  Guarded. 

 

In the event of worsening symptoms, the patient come back to the ER for further

evaluation and management. 

 

Plan of care was discussed with nursing staff.  Spent more than 35 minutes. 

 

Prognosis is very poor in this sick individual due to multiple comorbidities and overall

state. 

 

 

 

 

______________________________

MD GASTON Akbar/MODL

D:  12/02/2020 12:36:58

T:  12/02/2020 13:58:59

Job #:  [id number]

## 2025-05-26 NOTE — DIAGNOSTIC IMAGING REPORT
EXAMINATION:  CHEST SINGLE (PORTABLE)    



INDICATION:      

^Y

^reported wbc of 25k,

^[id number]

^2034

^Y



COMPARISON:  11/5/2012

     

FINDINGS:  AP view   



TUBES and LINES:  Stable tracheostomy tube.



LUNGS: Limited by rotation. Lungs are well inflated.  Diffuse bilateral

airspace opacities, slightly increased from prior exam.



PLEURA:  No pneumothorax. Suspected trace bilateral pleural effusions.



HEART AND MEDIASTINUM:  The cardiomediastinal silhouette is enlarged,

accentuated by rotation..    



BONES AND SOFT TISSUES:  No acute osseous lesion.  Soft tissues are

unremarkable.



UPPER ABDOMEN: No free air under the diaphragm.    



IMPRESSION: 

Diffuse bilateral airspace opacities, representing edema and/or pneumonia,

slightly worsened compared to prior x-ray.





Signed by: Dr. Tony Rivera MD on 11/25/2020 9:17 PM
EXAMINATION:  CHEST XRAY LINE PLACEMENT    



INDICATION:      

^PICC INSERTION



COMPARISON:  11/25/2020

     

FINDINGS:  AP view   



TUBES and LINES:  Stable tracheostomy tube. Interval placement of right PICC

with tip projecting over cavoatrial junction.



LUNGS: Limited by slight rotation. Lungs are well inflated.  Again seen

bilateral airspace opacities.



PLEURA:  No significant pleural effusion or pneumothorax.



HEART AND MEDIASTINUM:  The cardiomediastinal silhouette is enlarged.    



BONES AND SOFT TISSUES:  No acute osseous lesion.  Soft tissues are

unremarkable.



UPPER ABDOMEN: No free air under the diaphragm.    



IMPRESSION: 

New right PICC in place with tip projecting over cavoatrial junction. No

pneumothorax.

Redemonstration of diffuse bilateral airspace opacities.





Signed by: Dr. Tony Rivera MD on 11/26/2020 2:10 AM
No